# Patient Record
Sex: MALE | Race: WHITE | NOT HISPANIC OR LATINO | Employment: OTHER | URBAN - METROPOLITAN AREA
[De-identification: names, ages, dates, MRNs, and addresses within clinical notes are randomized per-mention and may not be internally consistent; named-entity substitution may affect disease eponyms.]

---

## 2017-02-22 DIAGNOSIS — Z90.79 S/P TURP (STATUS POST TRANSURETHRAL RESECTION OF PROSTATE): ICD-10-CM

## 2017-02-22 DIAGNOSIS — E78.5 HYPERLIPIDEMIA LDL GOAL <130: Primary | ICD-10-CM

## 2017-03-10 DIAGNOSIS — E78.5 HYPERLIPIDEMIA LDL GOAL <130: ICD-10-CM

## 2017-03-10 DIAGNOSIS — Z90.79 S/P TURP (STATUS POST TRANSURETHRAL RESECTION OF PROSTATE): ICD-10-CM

## 2017-03-10 LAB
ALBUMIN SERPL-MCNC: 3.8 G/DL (ref 3.4–5)
ALP SERPL-CCNC: 49 U/L (ref 40–150)
ALT SERPL W P-5'-P-CCNC: 27 U/L (ref 0–70)
ANION GAP SERPL CALCULATED.3IONS-SCNC: 5 MMOL/L (ref 3–14)
AST SERPL W P-5'-P-CCNC: 19 U/L (ref 0–45)
BASOPHILS # BLD AUTO: 0 10E9/L (ref 0–0.2)
BASOPHILS NFR BLD AUTO: 0.4 %
BILIRUB DIRECT SERPL-MCNC: 0.2 MG/DL (ref 0–0.2)
BILIRUB SERPL-MCNC: 0.5 MG/DL (ref 0.2–1.3)
BUN SERPL-MCNC: 16 MG/DL (ref 7–30)
CALCIUM SERPL-MCNC: 8.8 MG/DL (ref 8.5–10.1)
CHLORIDE SERPL-SCNC: 106 MMOL/L (ref 94–109)
CHOLEST SERPL-MCNC: 101 MG/DL
CO2 SERPL-SCNC: 29 MMOL/L (ref 20–32)
CREAT SERPL-MCNC: 1.11 MG/DL (ref 0.66–1.25)
DIFFERENTIAL METHOD BLD: NORMAL
EOSINOPHIL # BLD AUTO: 0.3 10E9/L (ref 0–0.7)
EOSINOPHIL NFR BLD AUTO: 4.4 %
ERYTHROCYTE [DISTWIDTH] IN BLOOD BY AUTOMATED COUNT: 12.5 % (ref 10–15)
GFR SERPL CREATININE-BSD FRML MDRD: 65 ML/MIN/1.7M2
GLUCOSE SERPL-MCNC: 99 MG/DL (ref 70–99)
HCT VFR BLD AUTO: 43.5 % (ref 40–53)
HDLC SERPL-MCNC: 37 MG/DL
HGB BLD-MCNC: 14.8 G/DL (ref 13.3–17.7)
LDLC SERPL CALC-MCNC: 48 MG/DL
LYMPHOCYTES # BLD AUTO: 1.8 10E9/L (ref 0.8–5.3)
LYMPHOCYTES NFR BLD AUTO: 23.3 %
MCH RBC QN AUTO: 32.3 PG (ref 26.5–33)
MCHC RBC AUTO-ENTMCNC: 34 G/DL (ref 31.5–36.5)
MCV RBC AUTO: 95 FL (ref 78–100)
MONOCYTES # BLD AUTO: 0.6 10E9/L (ref 0–1.3)
MONOCYTES NFR BLD AUTO: 7.5 %
NEUTROPHILS # BLD AUTO: 5 10E9/L (ref 1.6–8.3)
NEUTROPHILS NFR BLD AUTO: 64.4 %
NONHDLC SERPL-MCNC: 64 MG/DL
PLATELET # BLD AUTO: 158 10E9/L (ref 150–450)
POTASSIUM SERPL-SCNC: 3.6 MMOL/L (ref 3.4–5.3)
PROT SERPL-MCNC: 6.7 G/DL (ref 6.8–8.8)
PSA SERPL-ACNC: 3.03 UG/L (ref 0–4)
RBC # BLD AUTO: 4.58 10E12/L (ref 4.4–5.9)
SODIUM SERPL-SCNC: 140 MMOL/L (ref 133–144)
T4 FREE SERPL-MCNC: 0.81 NG/DL (ref 0.76–1.46)
TRIGL SERPL-MCNC: 78 MG/DL
TSH SERPL DL<=0.05 MIU/L-ACNC: 3.07 MU/L (ref 0.4–4)
WBC # BLD AUTO: 7.8 10E9/L (ref 4–11)

## 2017-03-10 PROCEDURE — 80048 BASIC METABOLIC PNL TOTAL CA: CPT | Performed by: INTERNAL MEDICINE

## 2017-03-10 PROCEDURE — 85025 COMPLETE CBC W/AUTO DIFF WBC: CPT | Performed by: INTERNAL MEDICINE

## 2017-03-10 PROCEDURE — 80061 LIPID PANEL: CPT | Performed by: INTERNAL MEDICINE

## 2017-03-10 PROCEDURE — 36415 COLL VENOUS BLD VENIPUNCTURE: CPT | Performed by: INTERNAL MEDICINE

## 2017-03-10 PROCEDURE — 84443 ASSAY THYROID STIM HORMONE: CPT | Performed by: INTERNAL MEDICINE

## 2017-03-10 PROCEDURE — G0103 PSA SCREENING: HCPCS | Performed by: INTERNAL MEDICINE

## 2017-03-10 PROCEDURE — 80076 HEPATIC FUNCTION PANEL: CPT | Performed by: INTERNAL MEDICINE

## 2017-03-10 PROCEDURE — 84439 ASSAY OF FREE THYROXINE: CPT | Performed by: INTERNAL MEDICINE

## 2017-03-17 ENCOUNTER — OFFICE VISIT (OUTPATIENT)
Dept: OTHER | Facility: CLINIC | Age: 72
End: 2017-03-17
Attending: INTERNAL MEDICINE
Payer: MEDICARE

## 2017-03-17 VITALS
BODY MASS INDEX: 32.3 KG/M2 | DIASTOLIC BLOOD PRESSURE: 58 MMHG | WEIGHT: 201 LBS | SYSTOLIC BLOOD PRESSURE: 114 MMHG | HEART RATE: 69 BPM | OXYGEN SATURATION: 98 % | HEIGHT: 66 IN

## 2017-03-17 DIAGNOSIS — E78.5 HYPERLIPIDEMIA LDL GOAL <130: ICD-10-CM

## 2017-03-17 DIAGNOSIS — Z00.00 ENCOUNTER FOR MEDICARE ANNUAL WELLNESS EXAM: Primary | ICD-10-CM

## 2017-03-17 DIAGNOSIS — N40.0 BENIGN NON-NODULAR PROSTATIC HYPERPLASIA WITHOUT LOWER URINARY TRACT SYMPTOMS: ICD-10-CM

## 2017-03-17 PROCEDURE — 99213 OFFICE O/P EST LOW 20 MIN: CPT | Mod: ZP | Performed by: INTERNAL MEDICINE

## 2017-03-17 PROCEDURE — 99211 OFF/OP EST MAY X REQ PHY/QHP: CPT

## 2017-03-17 RX ORDER — TERAZOSIN 10 MG/1
10 CAPSULE ORAL AT BEDTIME
Qty: 90 CAPSULE | Refills: 3 | Status: SHIPPED | OUTPATIENT
Start: 2017-03-17 | End: 2018-03-19

## 2017-03-17 RX ORDER — SIMVASTATIN 20 MG
20 TABLET ORAL AT BEDTIME
Qty: 90 TABLET | Refills: 3 | Status: SHIPPED | OUTPATIENT
Start: 2017-03-17 | End: 2018-03-19

## 2017-03-17 NOTE — PROGRESS NOTES
SUBJECTIVE:                                                            Candelario Ruth is a 71 year old male who presents for Preventive Visit.    Are you in the first 12 months of your Medicare Part B coverage?  No    Healthy Habits:    Do you get at least three servings of calcium containing foods daily (dairy, green leafy vegetables, etc.)? no, taking calcium and/or vitamin D supplement: yes - Centrum Silver    Amount of exercise or daily activities, outside of work: 2 hour(s) per day    Problems taking medications regularly No    Medication side effects: No    Have you had an eye exam in the past two years? yes    Do you see a dentist twice per year? yes    Do you have sleep apnea, excessive snoring or daytime drowsiness?yes    COGNITIVE SCREEN  1) Repeat 3 items (Banana, Sunrise, Chair)   2) Clock draw: NORMAL  3) 3 item recall: Recalls 1 object   Results: NORMAL clock, 1-2 items recalled: COGNITIVE IMPAIRMENT LESS LIKELY    Mini-CogTM Copyright S Bertram. Licensed by the author for use in [facility]; reprinted with permission ([email or web address]). All rights reserved.          PROBLEMS TO ADD ON... Patient has not had a pneumonia vaccine.    Reviewed and updated as needed this visit by clinical staff  Tobacco  Allergies  Meds  Med Hx  Surg Hx  Fam Hx  Soc Hx        Reviewed and updated as needed this visit by Provider        Social History   Substance Use Topics     Smoking status: Former Smoker     Quit date: 3/6/1973     Smokeless tobacco: Never Used     Alcohol use Yes      Comment: occasional       The patient does not drink >3 drinks per day nor >7 drinks per week.    Today's PHQ-2 Score:   PHQ-2 ( 1999 Pfizer) 4/4/2016 3/4/2016   Q1: Little interest or pleasure in doing things 0 0   Q2: Feeling down, depressed or hopeless 0 0   PHQ-2 Score 0 0       Do you feel safe in your environment - Yes    Do you have a Health Care Directive?: No: Advance care planning reviewed with patient;  information given to patient to review.    Current providers sharing in care for this patient include:   Patient Care Team:  Trenton Thomas MD as PCP - General      Hearing impairment: No    Ability to successfully perform activities of daily living: Yes, no assistance needed     Fall risk: 0    Home safety:  none identified        The following health maintenance items are reviewed in Epic and correct as of today:  Health Maintenance   Topic Date Due     HEPATITIS C SCREENING  03/28/1963     PNEUMOCOCCAL (1 of 2 - PCV13) 03/28/2010     AORTIC ANEURYSM SCREENING (SYSTEM ASSIGNED)  03/28/2010     FALL RISK ASSESSMENT  04/04/2017     INFLUENZA VACCINE (SYSTEM ASSIGNED)  09/01/2017     ADVANCE DIRECTIVE PLANNING Q5 YRS (NO INBASKET)  03/14/2018     COLONOSCOPY Q10 YR INBASKET MESSAGE  03/06/2022     LIPID SCREEN Q5 YR MALE (SYSTEM ASSIGNED)  03/10/2022     TETANUS Q10 YR  11/26/2024         Pneumonia Vaccine:Adults age 65+ who received their first dose of Pneumovax (PPSV23) prior to age 65 years: Should be given PCV 13 > 1 year after their most recent PPSV23 AND should be given a another dose of PPSV23 > 5 years after their most recent dose of PPSV23     ROS:  C: NEGATIVE for fever, chills, change in weight  I: NEGATIVE for worrisome rashes, moles or lesions  E: NEGATIVE for vision changes or irritation  E/M: NEGATIVE for ear, mouth and throat problems  R: NEGATIVE for significant cough or SOB  B: NEGATIVE for masses, tenderness or discharge  CV: NEGATIVE for chest pain, palpitations or peripheral edema  GI: NEGATIVE for nausea, abdominal pain, heartburn, or change in bowel habits  : NEGATIVE for frequency, dysuria, or hematuria  M: NEGATIVE for significant arthralgias or myalgia  N: NEGATIVE for weakness, dizziness or paresthesias  E: NEGATIVE for temperature intolerance, skin/hair changes  H: NEGATIVE for bleeding problems  P: NEGATIVE for changes in mood or affect    BP Readings from Last 3 Encounters:    17 114/58   16 137/64   16 135/76    Wt Readings from Last 3 Encounters:   17 201 lb (91.2 kg)   16 200 lb 9.6 oz (91 kg)   16 198 lb (89.8 kg)                  Patient Active Problem List   Diagnosis     Hypertension goal BP (blood pressure) < 130/80     Hyperlipidemia LDL goal <130     Advanced directives, counseling/discussion     S/P TURP (status post transurethral resection of prostate)     Past Surgical History   Procedure Laterality Date     C nonspecific procedure       lumbar surgery     Cystoscopy, transurethral resection (tur) prostate, combined N/A 2016     Procedure: COMBINED CYSTOSCOPY, TRANSURETHRAL RESECTION (TUR) PROSTATE;  Surgeon: Pepe Srinivasan MD;  Location:  OR       Social History   Substance Use Topics     Smoking status: Former Smoker     Quit date: 3/6/1973     Smokeless tobacco: Never Used     Alcohol use Yes      Comment: occasional     Family History   Problem Relation Age of Onset     HEART DISEASE Mother      alive age (1924)     DIABETES Father       age 84     Family History Negative Brother      alive age 57     Family History Negative Sister      alive age 60     Family History Negative Sister      alive age 55     Family History Negative Sister      alive age 52         Current Outpatient Prescriptions   Medication Sig Dispense Refill     simvastatin (ZOCOR) 20 MG tablet Take 1 tablet (20 mg) by mouth At Bedtime 90 tablet 3     terazosin (HYTRIN) 10 MG capsule Take 1 capsule (10 mg) by mouth At Bedtime 90 capsule 3     ciprofloxacin (CIPRO) 500 MG tablet Take 1 tablet (500 mg) by mouth 2 times daily (Patient not taking: Reported on 3/17/2017) 14 tablet 0     [DISCONTINUED] simvastatin (ZOCOR) 20 MG tablet Take 1 tablet (20 mg) by mouth At Bedtime 90 tablet 3     [DISCONTINUED] terazosin (HYTRIN) 10 MG capsule Take 1 capsule (10 mg) by mouth At Bedtime 90 capsule 3     Allergies   Allergen Reactions     Amoxicillin       "Turns him purple     OBJECTIVE:                                                            /58 (BP Location: Right arm, Patient Position: Chair, Cuff Size: Adult Large)  Pulse 69  Ht 5' 6\" (1.676 m)  Wt 201 lb (91.2 kg)  SpO2 98%  BMI 32.44 kg/m2 Estimated body mass index is 32.44 kg/(m^2) as calculated from the following:    Height as of this encounter: 5' 6\" (1.676 m).    Weight as of this encounter: 201 lb (91.2 kg).  EXAM:   GENERAL: healthy, alert and no distress  EYES: Eyes grossly normal to inspection, PERRL and conjunctivae and sclerae normal  HENT: ear canals and TM's normal, nose and mouth without ulcers or lesions  NECK: no adenopathy, no asymmetry, masses, or scars and thyroid normal to palpation  RESP: lungs clear to auscultation - no rales, rhonchi or wheezes  CV: regular rate and rhythm, normal S1 S2, no S3 or S4, no murmur, click or rub, no peripheral edema and peripheral pulses strong  ABDOMEN: soft, nontender, no hepatosplenomegaly, no masses and bowel sounds normal  MS: no gross musculoskeletal defects noted, no edema  SKIN: no suspicious lesions or rashes  NEURO: Normal strength and tone, mentation intact and speech normal  PSYCH: mentation appears normal, affect normal/bright    ASSESSMENT / PLAN:                                                            1. Encounter for Medicare annual wellness exam    - GASTROENTEROLOGY ADULT REF PROCEDURE ONLY  - Follow-Up with Vascular Medicine; Future  - CBC with platelets differential; Future  - T4 free; Future  - T3 Free; Future  - TSH; Future  - Basic metabolic panel; Future  - Hepatic panel; Future  - Lipid Profile; Future  - Hemoglobin A1c; Future  - Prostate spec antigen screen; Future  - PNEUMOCOCCAL CONJ VACCINE 13 VALENT IM (PCV13); Future    2. Hyperlipidemia LDL goal <130  At goal  - simvastatin (ZOCOR) 20 MG tablet; Take 1 tablet (20 mg) by mouth At Bedtime  Dispense: 90 tablet; Refill: 3  - OFFICE/OUTPT VISIT,JEAN MARIE PIERCE III  - Follow-Up " "with Vascular Medicine; Future  - T4 free; Future  - T3 Free; Future  - TSH; Future  - Lipid Profile; Future  - Hemoglobin A1c; Future    3. Benign non-nodular prostatic hyperplasia without lower urinary tract symptoms  No difficulty with urination  - terazosin (HYTRIN) 10 MG capsule; Take 1 capsule (10 mg) by mouth At Bedtime  Dispense: 90 capsule; Refill: 3  - OFFICE/OUTPT VISIT,EST,LEVL III  - Follow-Up with Vascular Medicine; Future  - Prostate spec antigen screen; Future    End of Life Planning:  Patient currently has an advanced directive: Yes.  Practitioner is supportive of decision. Pt wishes to be full code.    COUNSELING:  Reviewed preventive health counseling, as reflected in patient instructions       Regular exercise       Healthy diet/nutrition       Vision screening       Hearing screening       Dental care       Colon cancer screening        Estimated body mass index is 32.44 kg/(m^2) as calculated from the following:    Height as of this encounter: 5' 6\" (1.676 m).    Weight as of this encounter: 201 lb (91.2 kg).  Weight management plan: Discussed healthy diet and exercise guidelines and patient will follow up in 12 months in clinic to re-evaluate.   reports that he quit smoking about 44 years ago. He has never used smokeless tobacco.    Greater than one half the 15 minutes not spent on preventive care during this visit was spent providing aducation and counselling regarding item(s) # 2 onward as delineated above.     Appropriate preventive services were discussed with this patient, including applicable screening as appropriate for cardiovascular disease, diabetes, osteopenia/osteoporosis, and glaucoma.  As appropriate for age/gender, discussed screening for colorectal cancer, prostate cancer, breast cancer, and cervical cancer. Checklist reviewing preventive services available has been given to the patient.    Reviewed patients plan of care and provided an AVS. The Basic Care Plan (routine " screening as documented in Health Maintenance) for Candelario meets the Care Plan requirement. This Care Plan has been established and reviewed with the Patient.    Counseling Resources:  ATP IV Guidelines  Pooled Cohorts Equation Calculator  Breast Cancer Risk Calculator  FRAX Risk Assessment  ICSI Preventive Guidelines  Dietary Guidelines for Americans, 2010  USDA's MyPlate  ASA Prophylaxis  Lung CA Screening    Trenton Thomas MD  [facility] [place]    HPI      ROS      Physical Exam

## 2017-03-17 NOTE — NURSING NOTE
"Chief Complaint   Patient presents with     RECHECK     annual physical and labs       Initial /58 (BP Location: Right arm, Patient Position: Chair, Cuff Size: Adult Large)  Pulse 69  Ht 5' 6\" (1.676 m)  Wt 201 lb (91.2 kg)  SpO2 98%  BMI 32.44 kg/m2 Estimated body mass index is 32.44 kg/(m^2) as calculated from the following:    Height as of this encounter: 5' 6\" (1.676 m).    Weight as of this encounter: 201 lb (91.2 kg).  Medication Reconciliation: complete     Face to face time: 7 minutes    Liset Pimentel CMA    "

## 2017-03-17 NOTE — MR AVS SNAPSHOT
After Visit Summary   3/17/2017    Candelario Ruth    MRN: [id number]           Patient Information     Date Of Birth          1945        Visit Information        Provider Department      3/17/2017 8:00 AM Trenton Thomas MD [facility]      Today's Diagnoses     Encounter for Medicare annual wellness exam    -  1    Hyperlipidemia LDL goal <130        Benign non-nodular prostatic hyperplasia without lower urinary tract symptoms          Care Instructions      Preventive Health Recommendations:       Male Ages 65 and over    Yearly exam:             See your health care provider every year in order to  o   Review health changes.   o   Discuss preventive care.    o   Review your medicines if your doctor has prescribed any.    Talk with your health care provider about whether you should have a test to screen for prostate cancer (PSA).    Every 3 years, have a diabetes test (fasting glucose). If you are at risk for diabetes, you should have this test more often.    Every 5 years, have a cholesterol test. Have this test more often if you are at risk for high cholesterol or heart disease.     Every 10 years, have a colonoscopy. Or, have a yearly FIT test (stool test). These exams will check for colon cancer.    Talk to with your health care provider about screening for Abdominal Aortic Aneurysm if you have a family history of AAA or have a history of smoking.  Shots:     Get a flu shot each year.     Get a tetanus shot every 10 years.     Talk to your doctor about your pneumonia vaccines. There are now two you should receive - Pneumovax (PPSV 23) and Prevnar (PCV 13).    Talk to your doctor about a shingles vaccine.     Talk to your doctor about the hepatitis B vaccine.  Nutrition:     Eat at least 5 servings of fruits and vegetables each day.     Eat whole-grain bread, whole-wheat pasta and brown rice instead of white  grains and rice.     Talk to your doctor about Calcium and Vitamin D.   Lifestyle    Exercise for at least 150 minutes a week (30 minutes a day, 5 days a week). This will help you control your weight and prevent disease.     Limit alcohol to one drink per day.     No smoking.     Wear sunscreen to prevent skin cancer.     See your dentist every six months for an exam and cleaning.     See your eye doctor every 1 to 2 years to screen for conditions such as glaucoma, macular degeneration and cataracts.        Follow-ups after your visit        Additional Services     GASTROENTEROLOGY ADULT REF PROCEDURE ONLY       Dr. Vitor King, [phone]    Last Lab Result: Creatinine (mg/dL)       Date                     Value                 03/10/2017               1.11             ----------  Body mass index is 32.44 kg/(m^2).     Needed:  No  Language:  English    Patient will be contacted to schedule procedure.     Please be aware that coverage of these services is subject to the terms and limitations of your health insurance plan.  Call member services at your health plan with any benefit or coverage questions.  Any procedures must be performed at a [place] facility OR coordinated by your clinic's referral office.    Please bring the following with you to your appointment:    (1) Any X-Rays, CTs or MRIs which have been performed.  Contact the facility where they were done to arrange for  prior to your scheduled appointment.    (2) List of current medications   (3) This referral request   (4) Any documents/labs given to you for this referral            Follow-Up with Vascular Medicine                 Future tests that were ordered for you today     Open Future Orders        Priority Expected Expires Ordered    PNEUMOCOCCAL CONJ VACCINE 13 VALENT IM (PCV13) Routine 3/17/2018 3/17/2018 3/17/2017    T3 Free Routine 3/17/2018 4/17/2018 3/17/2017    TSH Routine 3/17/2018 4/17/2018 3/17/2017    Basic metabolic  "panel Routine 3/17/2018 2018 3/17/2017    Hepatic panel Routine 3/17/2018 2018 3/17/2017    Lipid Profile Routine 3/17/2018 2018 3/17/2017    Hemoglobin A1c Routine 3/17/2018 2018 3/17/2017    Prostate spec antigen screen Routine 3/17/2018 2018 3/17/2017    Follow-Up with Vascular Medicine Routine 3/17/2018 2018 3/17/2017    CBC with platelets differential Routine 3/17/2018 2018 3/17/2017    T4 free Routine 3/17/2018 2018 3/17/2017            Who to contact     If you have questions or need follow up information about today's clinic visit or your schedule please contact [facility] directly at [phone].  Normal or non-critical lab and imaging results will be communicated to you by MyChart, letter or phone within 4 business days after the clinic has received the results. If you do not hear from us within 7 days, please contact the clinic through Spor Chargershart or phone. If you have a critical or abnormal lab result, we will notify you by phone as soon as possible.  Submit refill requests through Motion Dispatch or call your pharmacy and they will forward the refill request to us. Please allow 3 business days for your refill to be completed.          Additional Information About Your Visit        MyChart Information     Motion Dispatch lets you send messages to your doctor, view your test results, renew your prescriptions, schedule appointments and more. To sign up, go to [email or web address] . Click on \"Log in\" on the left side of the screen, which will take you to the Welcome page. Then click on \"Sign up Now\" on the right side of the page.     You will be asked to enter the access code listed below, as well as some personal information. Please follow the directions to create your username and password.     Your access code is: [email or web address]  Expires: 6/15/2017  9:03 AM     Your access code will  in 90 days. If you need help or a new code, please call your [place] " "clinic or [phone].        Care EveryWhere ID     This is your Care EveryWhere ID. This could be used by other organizations to access your [place] medical records  [id number]        Your Vitals Were     Pulse Height Pulse Oximetry BMI (Body Mass Index)          69 5' 6\" (1.676 m) 98% 32.44 kg/m2         Blood Pressure from Last 3 Encounters:   03/17/17 114/58   04/13/16 137/64   04/04/16 135/76    Weight from Last 3 Encounters:   03/17/17 201 lb (91.2 kg)   04/12/16 200 lb 9.6 oz (91 kg)   04/04/16 198 lb (89.8 kg)              We Performed the Following     GASTROENTEROLOGY ADULT REF PROCEDURE ONLY     OFFICE/OUTPT VISIT,JEAN MARIE PIERCE III          Where to get your medicines      These medications were sent to [facility] [id number] [facility] [place] - [address] AT [facility]  [address] [place] [address]     Phone:  [phone]     simvastatin 20 MG tablet    terazosin 10 MG capsule          Primary Care Provider Office Phone # Fax #    Trenton Thomas [phone]       [place] [facility] [address]  [place] [address]        Thank you!     Thank you for choosing [facility] [place]  for your care. Our goal is always to provide you with excellent care. Hearing back from our patients is one way we can continue to improve our services. Please take a few minutes to complete the written survey that you may receive in the mail after your visit with us. Thank you!             Your Updated Medication List - Protect others around you: Learn how to safely use, store and throw away your medicines at www.disposemymeds.org.          This list is accurate as of: 3/17/17  9:03 AM.  Always use your most recent med list.                   Brand Name Dispense Instructions for use    ciprofloxacin 500 MG tablet    CIPRO    14 tablet    Take 1 tablet (500 mg) by mouth 2 times daily       simvastatin 20 MG tablet    ZOCOR    90 tablet    Take 1 " tablet (20 mg) by mouth At Bedtime       terazosin 10 MG capsule    HYTRIN    90 capsule    Take 1 capsule (10 mg) by mouth At Bedtime

## 2017-05-03 ENCOUNTER — OFFICE VISIT (OUTPATIENT)
Dept: OTHER | Facility: CLINIC | Age: 72
End: 2017-05-03
Attending: PHYSICIAN ASSISTANT
Payer: MEDICARE

## 2017-05-03 VITALS
WEIGHT: 199 LBS | HEART RATE: 79 BPM | BODY MASS INDEX: 32.12 KG/M2 | OXYGEN SATURATION: 97 % | DIASTOLIC BLOOD PRESSURE: 68 MMHG | SYSTOLIC BLOOD PRESSURE: 116 MMHG

## 2017-05-03 DIAGNOSIS — M54.5 ACUTE RIGHT-SIDED LOW BACK PAIN, WITH SCIATICA PRESENCE UNSPECIFIED: Primary | ICD-10-CM

## 2017-05-03 PROCEDURE — 99213 OFFICE O/P EST LOW 20 MIN: CPT | Mod: ZP | Performed by: PHYSICIAN ASSISTANT

## 2017-05-03 PROCEDURE — 99211 OFF/OP EST MAY X REQ PHY/QHP: CPT

## 2017-05-03 NOTE — LETTER
[facility]  [address]  [place] [address]  [phone]          May 3, 2017    RE:  Candelario Ruth                                                                                                                                                       [address] [place] [place] [address]            To whom it may concern:    Candelario Ruth was evaluated in our clinic on 5/3/17. Please excuse him from work due to health reasons on 5/2/17. Due to his health, he should work no longer than 10 hours at a time. Please feel free to call with any questions.     Sincerely,        Angie Amin PA-C  [phone]

## 2017-05-03 NOTE — NURSING NOTE
"Chief Complaint   Patient presents with     RECHECK     shooting pains down legs, thinks it is back/sciatic pain       Initial /68 (BP Location: Right arm, Patient Position: Chair, Cuff Size: Adult Large)  Pulse 79  Wt 199 lb (90.3 kg)  SpO2 97%  BMI 32.12 kg/m2 Estimated body mass index is 32.12 kg/(m^2) as calculated from the following:    Height as of 3/17/17: 5' 6\" (1.676 m).    Weight as of this encounter: 199 lb (90.3 kg).  Medication Reconciliation: complete    "

## 2017-05-03 NOTE — MR AVS SNAPSHOT
"              After Visit Summary   5/3/2017    Candelario Ruth    MRN: [id number]           Patient Information     Date Of Birth          1945        Visit Information        Provider Department      5/3/2017 11:15 AM Angie Amin PA-C [facility] [place] [facility]      Care Instructions    Please try Ibuprofen (Advil, Aleve) for pain and inflammation. If this is not helping, or symptoms worsen, then call and we can prescribe a medrol dose-jerilyn (steroid) and consider physical therapy.         Follow-ups after your visit        Follow-up notes from your care team     Return if symptoms worsen or fail to improve.      Who to contact     If you have questions or need follow up information about today's clinic visit or your schedule please contact [facility] directly at [phone].  Normal or non-critical lab and imaging results will be communicated to you by VONTRAVELhart, letter or phone within 4 business days after the clinic has received the results. If you do not hear from us within 7 days, please contact the clinic through VONTRAVELhart or phone. If you have a critical or abnormal lab result, we will notify you by phone as soon as possible.  Submit refill requests through Selectable Media or call your pharmacy and they will forward the refill request to us. Please allow 3 business days for your refill to be completed.          Additional Information About Your Visit        MyChart Information     Selectable Media lets you send messages to your doctor, view your test results, renew your prescriptions, schedule appointments and more. To sign up, go to [email or web address] . Click on \"Log in\" on the left side of the screen, which will take you to the Welcome page. Then click on \"Sign up Now\" on the right side of the page.     You will be asked to enter the access code listed below, as well as some personal information. Please follow the directions to " create your username and password.     Your access code is: [email or web address]  Expires: 6/15/2017  9:03 AM     Your access code will  in 90 days. If you need help or a new code, please call your [place] clinic or [phone].        Care EveryWhere ID     This is your Care EveryWhere ID. This could be used by other organizations to access your [place] medical records  [id number]        Your Vitals Were     Pulse Pulse Oximetry BMI (Body Mass Index)             79 97% 32.12 kg/m2          Blood Pressure from Last 3 Encounters:   17 116/68   17 114/58   16 137/64    Weight from Last 3 Encounters:   17 199 lb (90.3 kg)   17 201 lb (91.2 kg)   16 200 lb 9.6 oz (91 kg)              Today, you had the following     No orders found for display       Primary Care Provider Office Phone # Fax #    Isaiahariel Joseph Thomas [phone]       [place] [facility] [address]  [place] [address]        Thank you!     Thank you for choosing [facility] [place]  for your care. Our goal is always to provide you with excellent care. Hearing back from our patients is one way we can continue to improve our services. Please take a few minutes to complete the written survey that you may receive in the mail after your visit with us. Thank you!             Your Updated Medication List - Protect others around you: Learn how to safely use, store and throw away your medicines at www.disposemymeds.org.          This list is accurate as of: 5/3/17 11:15 AM.  Always use your most recent med list.                   Brand Name Dispense Instructions for use    simvastatin 20 MG tablet    ZOCOR    90 tablet    Take 1 tablet (20 mg) by mouth At Bedtime       terazosin 10 MG capsule    HYTRIN    90 capsule    Take 1 capsule (10 mg) by mouth At Bedtime

## 2017-05-03 NOTE — PATIENT INSTRUCTIONS
Please try Ibuprofen (Advil, Aleve) for pain and inflammation. If this is not helping, or symptoms worsen, then call and we can prescribe a medrol dose-jerilyn (steroid) and consider physical therapy.

## 2017-05-03 NOTE — PROGRESS NOTES
[facility]  VASCULAR MEDICINE     FOLLOW-UP VISIT      PRIMARY HEALTH CARE PROVIDER:  Trenton Thomas      REASON FOR VISIT:  Low back pain with right lower extremity radiculopathy      HPI: Candelario Ruth is a 72 year old male with a prior history of lumbar back surgery in . Since then he has done well up until 2 days ago when he developed low back pain with radiculopathy down the right leg all the way to his toes. He was unable to work yesterday due to the pain. He rested and took some Tylenol. Today his symptoms have improved. He states that his work hours were just increased from 10 hour shifts to 12 hour shifts. He stands on the job and feels his symptoms were aggravated by this increase in work hours. He had no trouble working 10 hour shifts. He denies any numbness, tingling, left lower extremity radiculopathy, loss of bowel or bladder function. This is not affecting sleep.     PAST MEDICAL HISTORY  Past Medical History:   Diagnosis Date     BPH      Sleep apnea        CURRENT MEDICATIONS    Current Outpatient Prescriptions on File Prior to Visit:  simvastatin (ZOCOR) 20 MG tablet Take 1 tablet (20 mg) by mouth At Bedtime   terazosin (HYTRIN) 10 MG capsule Take 1 capsule (10 mg) by mouth At Bedtime     No current facility-administered medications on file prior to visit.     PAST SURGICAL HISTORY:  Past Surgical History:   Procedure Laterality Date     C NONSPECIFIC PROCEDURE      lumbar surgery     CYSTOSCOPY, TRANSURETHRAL RESECTION (TUR) PROSTATE, COMBINED N/A 2016    Procedure: COMBINED CYSTOSCOPY, TRANSURETHRAL RESECTION (TUR) PROSTATE;  Surgeon: Pepe Srinivasan MD;  Location:  OR       ALLERGIES     Allergies   Allergen Reactions     Amoxicillin      Turns him purple       FAMILY HISTORY  Family History   Problem Relation Age of Onset     HEART DISEASE Mother      alive age (1924)     DIABETES Father       age 84     CEREBROVASCULAR  DISEASE Father      CEREBROVASCULAR DISEASE Brother      alive age 57     Family History Negative Sister      alive age 60     Family History Negative Sister      alive age 55     Family History Negative Sister      alive age 52       SOCIAL HISTORY  Social History     Social History     Marital status:      Spouse name: Andra     Number of children: 3     Years of education: 14     Occupational History     assembles [employer]     Social History Main Topics     Smoking status: Former Smoker     Quit date: 3/6/1973     Smokeless tobacco: Never Used     Alcohol use Yes      Comment: occasional     Drug use: No     Sexual activity: Not on file     Other Topics Concern     Not on file     Social History Narrative       ROS:   General: No change in weight, sleep or appetite.  Normal energy.  No fever or chills  Eyes: Negative for vision changes or eye problems  ENT: No problems with ears, nose or throat.  No difficulty swallowing.  Resp: No coughing, wheezing or shortness of breath  CV: No chest pains or palpitations  GI: No nausea, vomiting,  heartburn, abdominal pain, diarrhea, constipation or change in bowel habits  : No urinary frequency or dysuria, bladder or kidney problems  Musculoskeletal: Low back pain with right lower extremity radiculopathy x 2 days  Neurologic: No headaches, numbness, tingling, weakness, problems with balance or coordination  Psychiatric: No problems with anxiety, depression or mental health  Heme/immune/allergy: No history of bleeding or clotting problems or anemia.  No allergies or immune system problems  Endocrine: No history of thyroid disease, diabetes or other endocrine disorders  Skin: No rashes,worrisome lesions or skin problems  Vascular:  No claudication, lifestyle limiting or otherwise; no ischemic rest pain; no non-healing ulcers. No weakness, No loss of sensation     EXAM:  /68 (BP Location: Right arm, Patient Position: Chair, Cuff Size: Adult  Large)  Pulse 79  Wt 90.3 kg (199 lb)  SpO2 97%  BMI 32.12 kg/m2  In general, the patient is a pleasant male in no apparent distress.    HEENT: NC/AT.  PERRLA.  EOMI.  Sclerae white, not injected.  Nares clear.  Pharynx without erythema or exudate.  Dentition intact.    Neck: Carotids +2/2 bilaterally without bruits.    Heart: RRR. Normal S1, S2 splits physiologically. No murmur, rub, click, or gallop.   Lungs: CTA.  No ronchi, wheezes, rales.    Abdomen: Soft, nontender, nondistended.   Extremities: No clubbing, cyanosis, or edema. No wounds. Some low back / SI area tenderness on right.       Labs:  LIPID RESULTS:  Lab Results   Component Value Date    CHOL 101 03/10/2017    HDL 37 (L) 03/10/2017    LDL 48 03/10/2017    TRIG 78 03/10/2017    CHOLHDLRATIO 2.2 03/06/2015       LIVER ENZYME RESULTS:  Lab Results   Component Value Date    AST 19 03/10/2017    ALT 27 03/10/2017       CBC RESULTS:  Lab Results   Component Value Date    WBC 7.8 03/10/2017    RBC 4.58 03/10/2017    HGB 14.8 03/10/2017    HCT 43.5 03/10/2017    MCV 95 03/10/2017    MCH 32.3 03/10/2017    MCHC 34.0 03/10/2017    RDW 12.5 03/10/2017     03/10/2017       BMP RESULTS:  Lab Results   Component Value Date     03/10/2017    POTASSIUM 3.6 03/10/2017    CHLORIDE 106 03/10/2017    CO2 29 03/10/2017    ANIONGAP 5 03/10/2017    GLC 99 03/10/2017    BUN 16 03/10/2017    CR 1.11 03/10/2017    GFRESTIMATED 65 03/10/2017    GFRESTBLACK 79 03/10/2017    CODIE 8.8 03/10/2017        A1C RESULTS:  Lab Results   Component Value Date    A1C 5.4 02/25/2016       THYROID RESULTS:  Lab Results   Component Value Date    TSH 3.07 03/10/2017         Assessment and Plan:     (M54.5) Acute right-sided low back pain, with sciatica  Comment: Hx of prior radiculopathy for which he had surgery in 1993. Now recurrent symptoms after extended hours of work (on feet), but improving with rest yesterday.   Plan:   -Try ibuprofen as symptoms are improving.    -Declined medrol dose jerilyn today. If his symptoms do not improve, he was advised to call and this can be prescribed.   -Will refer to PT if symptoms don't improve.   -Note to work provided.         Angie Amin PA-C

## 2018-03-01 ENCOUNTER — TELEPHONE (OUTPATIENT)
Dept: OTHER | Facility: CLINIC | Age: 73
End: 2018-03-01

## 2018-03-01 NOTE — TELEPHONE ENCOUNTER
Called and left a message for the patient to call us back regarding an appointment. Dr. Thomas is unavailable at the current time, appointment needs to be rescheduled.

## 2018-03-12 DIAGNOSIS — N40.0 BENIGN NON-NODULAR PROSTATIC HYPERPLASIA WITHOUT LOWER URINARY TRACT SYMPTOMS: ICD-10-CM

## 2018-03-12 DIAGNOSIS — E78.5 HYPERLIPIDEMIA LDL GOAL <130: ICD-10-CM

## 2018-03-12 DIAGNOSIS — Z00.00 ENCOUNTER FOR MEDICARE ANNUAL WELLNESS EXAM: ICD-10-CM

## 2018-03-12 LAB
ALBUMIN SERPL-MCNC: 3.7 G/DL (ref 3.4–5)
ALP SERPL-CCNC: 49 U/L (ref 40–150)
ALT SERPL W P-5'-P-CCNC: 26 U/L (ref 0–70)
ANION GAP SERPL CALCULATED.3IONS-SCNC: 4 MMOL/L (ref 3–14)
AST SERPL W P-5'-P-CCNC: 22 U/L (ref 0–45)
BASOPHILS # BLD AUTO: 0 10E9/L (ref 0–0.2)
BASOPHILS NFR BLD AUTO: 0.3 %
BILIRUB DIRECT SERPL-MCNC: 0.2 MG/DL (ref 0–0.2)
BILIRUB SERPL-MCNC: 0.5 MG/DL (ref 0.2–1.3)
BUN SERPL-MCNC: 14 MG/DL (ref 7–30)
CALCIUM SERPL-MCNC: 8.8 MG/DL (ref 8.5–10.1)
CHLORIDE SERPL-SCNC: 106 MMOL/L (ref 94–109)
CHOLEST SERPL-MCNC: 110 MG/DL
CO2 SERPL-SCNC: 30 MMOL/L (ref 20–32)
CREAT SERPL-MCNC: 1.22 MG/DL (ref 0.66–1.25)
DIFFERENTIAL METHOD BLD: NORMAL
EOSINOPHIL # BLD AUTO: 0.3 10E9/L (ref 0–0.7)
EOSINOPHIL NFR BLD AUTO: 4.4 %
ERYTHROCYTE [DISTWIDTH] IN BLOOD BY AUTOMATED COUNT: 12.6 % (ref 10–15)
GFR SERPL CREATININE-BSD FRML MDRD: 58 ML/MIN/1.7M2
GLUCOSE SERPL-MCNC: 108 MG/DL (ref 70–99)
HBA1C MFR BLD: 5.5 % (ref 4.3–6)
HCT VFR BLD AUTO: 44.9 % (ref 40–53)
HDLC SERPL-MCNC: 38 MG/DL
HGB BLD-MCNC: 14.8 G/DL (ref 13.3–17.7)
LDLC SERPL CALC-MCNC: 51 MG/DL
LYMPHOCYTES # BLD AUTO: 1.5 10E9/L (ref 0.8–5.3)
LYMPHOCYTES NFR BLD AUTO: 23.6 %
MCH RBC QN AUTO: 31.7 PG (ref 26.5–33)
MCHC RBC AUTO-ENTMCNC: 33 G/DL (ref 31.5–36.5)
MCV RBC AUTO: 96 FL (ref 78–100)
MONOCYTES # BLD AUTO: 0.5 10E9/L (ref 0–1.3)
MONOCYTES NFR BLD AUTO: 7.7 %
NEUTROPHILS # BLD AUTO: 4.2 10E9/L (ref 1.6–8.3)
NEUTROPHILS NFR BLD AUTO: 64 %
NONHDLC SERPL-MCNC: 72 MG/DL
PLATELET # BLD AUTO: 152 10E9/L (ref 150–450)
POTASSIUM SERPL-SCNC: 4.3 MMOL/L (ref 3.4–5.3)
PROT SERPL-MCNC: 6.9 G/DL (ref 6.8–8.8)
PSA SERPL-ACNC: 3.16 UG/L (ref 0–4)
RBC # BLD AUTO: 4.67 10E12/L (ref 4.4–5.9)
SODIUM SERPL-SCNC: 140 MMOL/L (ref 133–144)
T3FREE SERPL-MCNC: 2.4 PG/ML (ref 2.3–4.2)
T4 FREE SERPL-MCNC: 0.78 NG/DL (ref 0.76–1.46)
TRIGL SERPL-MCNC: 106 MG/DL
TSH SERPL DL<=0.005 MIU/L-ACNC: 2.07 MU/L (ref 0.4–4)
WBC # BLD AUTO: 6.5 10E9/L (ref 4–11)

## 2018-03-12 PROCEDURE — 85025 COMPLETE CBC W/AUTO DIFF WBC: CPT | Performed by: INTERNAL MEDICINE

## 2018-03-12 PROCEDURE — 80076 HEPATIC FUNCTION PANEL: CPT | Performed by: INTERNAL MEDICINE

## 2018-03-12 PROCEDURE — 80061 LIPID PANEL: CPT | Performed by: INTERNAL MEDICINE

## 2018-03-12 PROCEDURE — 36415 COLL VENOUS BLD VENIPUNCTURE: CPT | Performed by: INTERNAL MEDICINE

## 2018-03-12 PROCEDURE — 83036 HEMOGLOBIN GLYCOSYLATED A1C: CPT | Performed by: INTERNAL MEDICINE

## 2018-03-12 PROCEDURE — 84439 ASSAY OF FREE THYROXINE: CPT | Performed by: INTERNAL MEDICINE

## 2018-03-12 PROCEDURE — 84481 FREE ASSAY (FT-3): CPT | Performed by: INTERNAL MEDICINE

## 2018-03-12 PROCEDURE — 80048 BASIC METABOLIC PNL TOTAL CA: CPT | Performed by: INTERNAL MEDICINE

## 2018-03-12 PROCEDURE — 84443 ASSAY THYROID STIM HORMONE: CPT | Performed by: INTERNAL MEDICINE

## 2018-03-12 PROCEDURE — G0103 PSA SCREENING: HCPCS | Performed by: INTERNAL MEDICINE

## 2018-03-20 ENCOUNTER — TELEPHONE (OUTPATIENT)
Dept: OTHER | Facility: CLINIC | Age: 73
End: 2018-03-20

## 2018-03-20 ENCOUNTER — OFFICE VISIT (OUTPATIENT)
Dept: OTHER | Facility: CLINIC | Age: 73
End: 2018-03-20
Attending: INTERNAL MEDICINE
Payer: MEDICARE

## 2018-03-20 VITALS
OXYGEN SATURATION: 97 % | BODY MASS INDEX: 34.7 KG/M2 | SYSTOLIC BLOOD PRESSURE: 129 MMHG | WEIGHT: 215 LBS | HEART RATE: 56 BPM | DIASTOLIC BLOOD PRESSURE: 68 MMHG

## 2018-03-20 DIAGNOSIS — E78.5 HYPERLIPIDEMIA LDL GOAL <130: ICD-10-CM

## 2018-03-20 DIAGNOSIS — N40.0 BENIGN NON-NODULAR PROSTATIC HYPERPLASIA WITHOUT LOWER URINARY TRACT SYMPTOMS: ICD-10-CM

## 2018-03-20 DIAGNOSIS — R73.01 IFG (IMPAIRED FASTING GLUCOSE): ICD-10-CM

## 2018-03-20 DIAGNOSIS — Z00.00 MEDICARE ANNUAL WELLNESS VISIT, SUBSEQUENT: Primary | ICD-10-CM

## 2018-03-20 PROCEDURE — G0439 PPPS, SUBSEQ VISIT: HCPCS | Mod: ZP | Performed by: INTERNAL MEDICINE

## 2018-03-20 PROCEDURE — 99213 OFFICE O/P EST LOW 20 MIN: CPT | Mod: ZP | Performed by: INTERNAL MEDICINE

## 2018-03-20 PROCEDURE — G0463 HOSPITAL OUTPT CLINIC VISIT: HCPCS

## 2018-03-20 RX ORDER — SIMVASTATIN 20 MG
20 TABLET ORAL AT BEDTIME
Qty: 90 TABLET | Refills: 3 | Status: SHIPPED | OUTPATIENT
Start: 2018-03-20 | End: 2019-03-04

## 2018-03-20 RX ORDER — TERAZOSIN 10 MG/1
10 CAPSULE ORAL AT BEDTIME
Qty: 90 CAPSULE | Refills: 3 | Status: SHIPPED | OUTPATIENT
Start: 2018-03-20 | End: 2019-03-04

## 2018-03-20 NOTE — PROGRESS NOTES
Candelario Ruth is a 72 year old male who presents for:       Medicare annual wellness visit, subsequent  Hyperlipidemia LDL goal <130  Benign non-nodular prostatic hyperplasia without lower urinary tract symptoms       Current providers caring for this patient include:  Patient Care Team:  Trenton Thomas MD as PCP - General    Complete Medical and Social history reviewed with patient, outlined below.    Patient Active Problem List   Diagnosis     Hypertension goal BP (blood pressure) < 130/80     Hyperlipidemia LDL goal <130     Advanced directives, counseling/discussion     S/P TURP (status post transurethral resection of prostate)       Past Medical History:   Diagnosis Date     BPH      Sleep apnea        Past Surgical History:   Procedure Laterality Date     C NONSPECIFIC PROCEDURE      lumbar surgery     CYSTOSCOPY, TRANSURETHRAL RESECTION (TUR) PROSTATE, COMBINED N/A 2016    Procedure: COMBINED CYSTOSCOPY, TRANSURETHRAL RESECTION (TUR) PROSTATE;  Surgeon: Pepe Srinivasan MD;  Location: [facility] OR       Family History   Problem Relation Age of Onset     HEART DISEASE Mother      alive age (1924)     DIABETES Father       age 84     CEREBROVASCULAR DISEASE Father      CEREBROVASCULAR DISEASE Brother      alive age 57     Family History Negative Sister      alive age 60     Family History Negative Sister      alive age 55     Family History Negative Sister      alive age 52       Social History   Substance Use Topics     Smoking status: Former Smoker     Quit date: 3/6/1973     Smokeless tobacco: Never Used     Alcohol use Yes      Comment: occasional       Diet: regular, low salt/low fat  Physical Activity: generally inactive  Depression Screen:    Over the past 2 weeks, patient has felt down, depressed, or hopeless:  No    Over the past 2 weeks, patient has felt little interest or pleasure in doing things: No    Functional ability/Safety screen:  Up and go test (able to get up  and walk longer than 30 seconds): Passed  Patient needs assistance with: nothing  Patient's home has the following possible safety concerns: none identified  Patient has concerns about his hearing:  No  Cognitive Screen  Patient repeats three objects (ball, flag, tree)      Clock drawing test: NORMAL  Recalls three objects after 3 minutes (ball,flag,tree):                                                                                               recalls 3 objects (3 points)      Review Of Systems  Skin: negative  Eyes: negative  Ears/Nose/Throat: negative  Respiratory: No shortness of breath, dyspnea on exertion, cough, or hemoptysis  Cardiovascular: negative  Gastrointestinal: negative  Genitourinary: negative  Musculoskeletal: negative  Neurologic: negative  Psychiatric: negative  Hematologic/Lymphatic/Immunologic: negative  Endocrine: negative     Physical Exam:  /68 (BP Location: Right arm, Patient Position: Chair, Cuff Size: Adult Large)  Pulse 56  Wt 215 lb (97.5 kg)  SpO2 97%  BMI 34.7 kg/m2   Body mass index is 34.7 kg/(m^2).                GENERAL APPEARANCE: healthy, alert and no distress  PSYCH: Affect normal/bright.  Mentation within normal limits.  EYES: conjunctiva clear  HENT: ear canals and TM's normal.  Nose and mouth without ulcers, erythema or lesions  NECK: supple, nontender, no lymphadenopathy  RESP: lungs clear to auscultation - no rales, rhonchi or wheezes  CV: regular rates and rhythm, normal S1 S2, no murmur noted  ABDOMEN:  soft, nontender, no HSM or masses and bowel sounds normal  SKIN: no suspicious lesions or rashes  NEURO: Normal strength and tone,  normal speech and mentation  Extremities: no peripheral edema or tenderness, peripheral pulses normal  MS: extremities normal- no gross deformities noted, no erythema, FROM noted in all extremities  PSYCH: mentation appears normal  LYMPHATICS: no cervical adenopathy    End of Life Planning:   Patient currently has an advanced  directive: Yes.  Practitioner is supportive of decision. Full code    Education/Counseling:   Based on review of the above information, the following items were addressed:      Obesity - appropriate counseling on diet, exercise, etc.    Appropriate preventive services were discussed with this patient, including applicable screening as appropriate for cardiovascular disease, diabetes, osteopenia/osteoporosis, and glaucoma.  As appropriate for age/gender, discussed screening for colorectal cancer, prostate cancer, breast cancer, and cervical cancer.   Checklist reviewing preventive services available has been given to the patient.         Component      Latest Ref Rng & Units 3/10/2017 3/12/2018   WBC      4.0 - 11.0 10e9/L 7.8 6.5   RBC Count      4.4 - 5.9 10e12/L 4.58 4.67   Hemoglobin      13.3 - 17.7 g/dL 14.8 14.8   Hematocrit      40.0 - 53.0 % 43.5 44.9   MCV      78 - 100 fl 95 96   MCH      26.5 - 33.0 pg 32.3 31.7   MCHC      31.5 - 36.5 g/dL 34.0 33.0   RDW      10.0 - 15.0 % 12.5 12.6   Platelet Count      150 - 450 10e9/L 158 152   Diff Method       Automated Method Automated Method   % Neutrophils      % 64.4 64.0   % Lymphocytes      % 23.3 23.6   % Monocytes      % 7.5 7.7   % Eosinophils      % 4.4 4.4   % Basophils      % 0.4 0.3   Absolute Neutrophil      1.6 - 8.3 10e9/L 5.0 4.2   Absolute Lymphocytes      0.8 - 5.3 10e9/L 1.8 1.5   Absolute Monocytes      0.0 - 1.3 10e9/L 0.6 0.5   Absolute Eosinophils      0.0 - 0.7 10e9/L 0.3 0.3   Absolute Basophils      0.0 - 0.2 10e9/L 0.0 0.0   Sodium      133 - 144 mmol/L 140 140   Potassium      3.4 - 5.3 mmol/L 3.6 4.3   Chloride      94 - 109 mmol/L 106 106   Carbon Dioxide      20 - 32 mmol/L 29 30   Anion Gap      3 - 14 mmol/L 5 4   Glucose      70 - 99 mg/dL 99 108 (H)   Urea Nitrogen      7 - 30 mg/dL 16 14   Creatinine      0.66 - 1.25 mg/dL 1.11 1.22   GFR Estimate      >60 mL/min/1.7m2 65 58 (L)   GFR Estimate If Black      >60 mL/min/1.7m2 79  70   Calcium      8.5 - 10.1 mg/dL 8.8 8.8   Bilirubin Direct      0.0 - 0.2 mg/dL 0.2 0.2   Bilirubin Total      0.2 - 1.3 mg/dL 0.5 0.5   Albumin      3.4 - 5.0 g/dL 3.8 3.7   Protein Total      6.8 - 8.8 g/dL 6.7 (L) 6.9   Alkaline Phosphatase      40 - 150 U/L 49 49   ALT      0 - 70 U/L 27 26   AST      0 - 45 U/L 19 22   Cholesterol      <200 mg/dL 101 110   Triglycerides      <150 mg/dL 78 106   HDL Cholesterol      >39 mg/dL 37 (L) 38 (L)   LDL Cholesterol Calculated      <100 mg/dL 48 51   Non HDL Cholesterol      <130 mg/dL 64 72   T4 Free      0.76 - 1.46 ng/dL 0.81 0.78   TSH      0.40 - 4.00 mU/L 3.07 2.07   PSA      0 - 4 ug/L 3.03 3.16   Free T3      2.3 - 4.2 pg/mL  2.4   Hemoglobin A1C      4.3 - 6.0 %  5.5       (Z00.00) Medicare annual wellness visit, subsequent  (primary encounter diagnosis)  Comment: needs a colonoscopy, RTC one year after labs  Plan: GASTROENTEROLOGY ADULT REF PROCEDURE ONLY         Other; (Dr. Vitor King, [phone])           (E78.5) Hyperlipidemia LDL goal <130  Comment: at goal  Plan: OFFICE/OUTPT VISIT,EST,LEVL III, simvastatin         (ZOCOR) 20 MG tablet           (N40.0) Benign non-nodular prostatic hyperplasia without lower urinary tract symptoms, S/P TURP  Comment: doing well  Plan: OFFICE/OUTPT VISIT,EST,LEVL III, terazosin         (HYTRIN) 10 MG capsule            Greater than one half the 15 minutes not spent on preventive care during this visit was spent providing aducation and counselling regarding item(s) # 2 onward as delineated above.

## 2018-03-20 NOTE — MR AVS SNAPSHOT
After Visit Summary   3/20/2018    Candelario Ruth    MRN: [id number]           Patient Information     Date Of Birth          1945        Visit Information        Provider Department      3/20/2018 1:00 PM Trenton Thomas MD [facility]      Today's Diagnoses     Medicare annual wellness visit, subsequent    -  1    Hyperlipidemia LDL goal <130        Benign non-nodular prostatic hyperplasia without lower urinary tract symptoms, S/P TURP        IFG (impaired fasting glucose)           Follow-ups after your visit        Additional Services     GASTROENTEROLOGY ADULT REF PROCEDURE ONLY Other; (Dr. Vitor King, [phone])       Last Lab Result: Creatinine (mg/dL)       Date                     Value                 03/12/2018               1.22             ----------  Body mass index is 34.7 kg/(m^2).     Needed:  No  Language:  English    Patient will be contacted to schedule procedure.     Please be aware that coverage of these services is subject to the terms and limitations of your health insurance plan.  Call member services at your health plan with any benefit or coverage questions.  Any procedures must be performed at a [place] facility OR coordinated by your clinic's referral office.    Please bring the following with you to your appointment:    (1) Any X-Rays, CTs or MRIs which have been performed.  Contact the facility where they were done to arrange for  prior to your scheduled appointment.    (2) List of current medications   (3) This referral request   (4) Any documents/labs given to you for this referral                  Future tests that were ordered for you today     Open Future Orders        Priority Expected Expires Ordered    Follow-Up with Vascular Medicine Routine 3/19/2019 3/19/2019 3/19/2018    CBC with platelets differential Routine 3/19/2019 3/19/2019 3/19/2018    T3 Free  "Routine 3/19/2019 3/19/2019 3/19/2018    T4 free Routine 3/19/2019 3/19/2019 3/19/2018    TSH Routine 3/19/2019 3/19/2019 3/19/2018    Basic metabolic panel Routine 3/19/2019 3/19/2019 3/19/2018    Hepatic panel Routine 3/19/2019 3/19/2019 3/19/2018    Lipid Profile Routine 3/19/2019 3/19/2019 3/19/2018    Prostate spec antigen screen Routine 3/19/2019 3/19/2019 3/19/2018            Who to contact     If you have questions or need follow up information about today's clinic visit or your schedule please contact [facility] directly at [phone].  Normal or non-critical lab and imaging results will be communicated to you by MyChart, letter or phone within 4 business days after the clinic has received the results. If you do not hear from us within 7 days, please contact the clinic through MyChart or phone. If you have a critical or abnormal lab result, we will notify you by phone as soon as possible.  Submit refill requests through [employer] or call your pharmacy and they will forward the refill request to us. Please allow 3 business days for your refill to be completed.          Additional Information About Your Visit        [employer] Information     [employer] lets you send messages to your doctor, view your test results, renew your prescriptions, schedule appointments and more. To sign up, go to [email or web address] . Click on \"Log in\" on the left side of the screen, which will take you to the Welcome page. Then click on \"Sign up Now\" on the right side of the page.     You will be asked to enter the access code listed below, as well as some personal information. Please follow the directions to create your username and password.     Your access code is: [email or web address]  Expires: 2018 12:51 PM     Your access code will  in 90 days. If you need help or a new code, please call your [place] clinic or [phone].        Care EveryWhere ID     This is your Care EveryWhere ID. This could be " used by other organizations to access your [place] medical records  [id number]        Your Vitals Were     Pulse Pulse Oximetry BMI (Body Mass Index)             56 97% 34.7 kg/m2          Blood Pressure from Last 3 Encounters:   03/20/18 129/68   05/03/17 116/68   03/17/17 114/58    Weight from Last 3 Encounters:   03/20/18 215 lb (97.5 kg)   05/03/17 199 lb (90.3 kg)   03/17/17 201 lb (91.2 kg)              We Performed the Following     GASTROENTEROLOGY ADULT REF PROCEDURE ONLY Other; (Dr. Vitor King, [phone])     OFFICE/OUTPT VISIT,EST,LEVL III          Where to get your medicines      These medications were sent to [id number] [place] - [address] [facility]  [address] [place] [address]     Phone:  [phone]     simvastatin 20 MG tablet    terazosin 10 MG capsule          Primary Care Provider Office Phone # Fax #    Sharanjordy Thomas [phone]       [place] [facility] [address]  [place] [address]        Equal Access to Services     DONNIE NICHOLSON AH: Hadii katherine ku hadasho Soomaali, waaxda luqadaha, qaybta kaalmada adeegyada, monique haile. So [facility] [phone].    ATENCIÓN: Si habla español, tiene a sierra disposición servicios gratuitos de asistencia lingüística. Saleemame al [phone].    We comply with applicable federal civil rights laws and [place] laws. We do not discriminate on the basis of race, color, national origin, age, disability, sex, sexual orientation, or gender identity.            Thank you!     Thank you for choosing [facility]  for your care. Our goal is always to provide you with excellent care. Hearing back from our patients is one way we can continue to improve our services. Please take a few minutes to complete the written survey that you may receive in the mail after your visit with us. Thank you!             Your Updated Medication List -  Protect others around you: Learn how to safely use, store and throw away your medicines at www.disposemymeds.org.          This list is accurate as of 3/20/18  1:45 PM.  Always use your most recent med list.                   Brand Name Dispense Instructions for use Diagnosis    simvastatin 20 MG tablet    ZOCOR    90 tablet    Take 1 tablet (20 mg) by mouth At Bedtime    Hyperlipidemia LDL goal <130       terazosin 10 MG capsule    HYTRIN    90 capsule    Take 1 capsule (10 mg) by mouth At Bedtime    Benign non-nodular prostatic hyperplasia without lower urinary tract symptoms

## 2018-03-20 NOTE — TELEPHONE ENCOUNTER
Patient called to report last colonoscopy date  It was 12/19/11  Routed to PCP as RAINER Santacruz, RN, BSN

## 2018-03-20 NOTE — NURSING NOTE
"Chief Complaint   Patient presents with     RECHECK     Fasting labs done 03/12/18       Initial /68 (BP Location: Right arm, Patient Position: Chair, Cuff Size: Adult Large)  Pulse 56  Wt 215 lb (97.5 kg)  SpO2 97%  BMI 34.7 kg/m2 Estimated body mass index is 34.7 kg/(m^2) as calculated from the following:    Height as of 3/17/17: 5' 6\" (1.676 m).    Weight as of this encounter: 215 lb (97.5 kg).  Medication Reconciliation: complete     Ladi Lopez MA   "

## 2019-02-25 LAB
ALBUMIN SERPL-MCNC: 4 G/DL (ref 3.4–5)
ALP SERPL-CCNC: 51 U/L (ref 40–150)
ALT SERPL W P-5'-P-CCNC: 36 U/L (ref 0–70)
ANION GAP SERPL CALCULATED.3IONS-SCNC: 2 MMOL/L (ref 3–14)
AST SERPL W P-5'-P-CCNC: 27 U/L (ref 0–45)
BASOPHILS # BLD AUTO: 0 10E9/L (ref 0–0.2)
BASOPHILS NFR BLD AUTO: 0.4 %
BILIRUB DIRECT SERPL-MCNC: 0.2 MG/DL (ref 0–0.2)
BILIRUB SERPL-MCNC: 0.5 MG/DL (ref 0.2–1.3)
BUN SERPL-MCNC: 15 MG/DL (ref 7–30)
CALCIUM SERPL-MCNC: 9.1 MG/DL (ref 8.5–10.1)
CHLORIDE SERPL-SCNC: 105 MMOL/L (ref 94–109)
CHOLEST SERPL-MCNC: 135 MG/DL
CO2 SERPL-SCNC: 31 MMOL/L (ref 20–32)
CREAT SERPL-MCNC: 1.17 MG/DL (ref 0.66–1.25)
DIFFERENTIAL METHOD BLD: NORMAL
EOSINOPHIL # BLD AUTO: 0.3 10E9/L (ref 0–0.7)
EOSINOPHIL NFR BLD AUTO: 4.4 %
ERYTHROCYTE [DISTWIDTH] IN BLOOD BY AUTOMATED COUNT: 12.7 % (ref 10–15)
GFR SERPL CREATININE-BSD FRML MDRD: 61 ML/MIN/{1.73_M2}
GLUCOSE SERPL-MCNC: 118 MG/DL (ref 70–99)
HCT VFR BLD AUTO: 44.9 % (ref 40–53)
HDLC SERPL-MCNC: 41 MG/DL
HGB BLD-MCNC: 15.3 G/DL (ref 13.3–17.7)
LDLC SERPL CALC-MCNC: 73 MG/DL
LYMPHOCYTES # BLD AUTO: 1.8 10E9/L (ref 0.8–5.3)
LYMPHOCYTES NFR BLD AUTO: 23.8 %
MCH RBC QN AUTO: 31.9 PG (ref 26.5–33)
MCHC RBC AUTO-ENTMCNC: 34.1 G/DL (ref 31.5–36.5)
MCV RBC AUTO: 94 FL (ref 78–100)
MONOCYTES # BLD AUTO: 0.6 10E9/L (ref 0–1.3)
MONOCYTES NFR BLD AUTO: 8.2 %
NEUTROPHILS # BLD AUTO: 4.7 10E9/L (ref 1.6–8.3)
NEUTROPHILS NFR BLD AUTO: 63.2 %
NONHDLC SERPL-MCNC: 94 MG/DL
PLATELET # BLD AUTO: 172 10E9/L (ref 150–450)
POTASSIUM SERPL-SCNC: 3.8 MMOL/L (ref 3.4–5.3)
PROT SERPL-MCNC: 7.1 G/DL (ref 6.8–8.8)
PSA SERPL-ACNC: 3.55 UG/L (ref 0–4)
RBC # BLD AUTO: 4.8 10E12/L (ref 4.4–5.9)
SODIUM SERPL-SCNC: 138 MMOL/L (ref 133–144)
T3FREE SERPL-MCNC: 2.8 PG/ML (ref 2.3–4.2)
T4 FREE SERPL-MCNC: 0.76 NG/DL (ref 0.76–1.46)
TRIGL SERPL-MCNC: 106 MG/DL
TSH SERPL DL<=0.005 MIU/L-ACNC: 3.5 MU/L (ref 0.4–4)
WBC # BLD AUTO: 7.4 10E9/L (ref 4–11)

## 2019-02-25 PROCEDURE — 84443 ASSAY THYROID STIM HORMONE: CPT | Performed by: INTERNAL MEDICINE

## 2019-02-25 PROCEDURE — G0103 PSA SCREENING: HCPCS | Performed by: INTERNAL MEDICINE

## 2019-02-25 PROCEDURE — 80076 HEPATIC FUNCTION PANEL: CPT | Performed by: INTERNAL MEDICINE

## 2019-02-25 PROCEDURE — 80048 BASIC METABOLIC PNL TOTAL CA: CPT | Performed by: INTERNAL MEDICINE

## 2019-02-25 PROCEDURE — 80061 LIPID PANEL: CPT | Performed by: INTERNAL MEDICINE

## 2019-02-25 PROCEDURE — 36415 COLL VENOUS BLD VENIPUNCTURE: CPT | Performed by: INTERNAL MEDICINE

## 2019-02-25 PROCEDURE — 85025 COMPLETE CBC W/AUTO DIFF WBC: CPT | Performed by: INTERNAL MEDICINE

## 2019-02-25 PROCEDURE — 84439 ASSAY OF FREE THYROXINE: CPT | Performed by: INTERNAL MEDICINE

## 2019-02-25 PROCEDURE — 84481 FREE ASSAY (FT-3): CPT | Performed by: INTERNAL MEDICINE

## 2019-03-04 ENCOUNTER — OFFICE VISIT (OUTPATIENT)
Dept: OTHER | Facility: CLINIC | Age: 74
End: 2019-03-04
Attending: INTERNAL MEDICINE
Payer: MEDICARE

## 2019-03-04 VITALS
OXYGEN SATURATION: 98 % | HEART RATE: 81 BPM | WEIGHT: 225 LBS | RESPIRATION RATE: 18 BRPM | DIASTOLIC BLOOD PRESSURE: 68 MMHG | SYSTOLIC BLOOD PRESSURE: 128 MMHG | BODY MASS INDEX: 37.49 KG/M2 | HEIGHT: 65 IN

## 2019-03-04 DIAGNOSIS — R73.01 IFG (IMPAIRED FASTING GLUCOSE): ICD-10-CM

## 2019-03-04 DIAGNOSIS — Z00.00 MEDICARE ANNUAL WELLNESS VISIT, SUBSEQUENT: Primary | ICD-10-CM

## 2019-03-04 DIAGNOSIS — N40.0 BENIGN NON-NODULAR PROSTATIC HYPERPLASIA WITHOUT LOWER URINARY TRACT SYMPTOMS: ICD-10-CM

## 2019-03-04 DIAGNOSIS — Z12.5 SCREENING FOR PROSTATE CANCER: ICD-10-CM

## 2019-03-04 DIAGNOSIS — E78.5 HYPERLIPIDEMIA LDL GOAL <130: ICD-10-CM

## 2019-03-04 PROCEDURE — G0439 PPPS, SUBSEQ VISIT: HCPCS | Mod: ZP | Performed by: INTERNAL MEDICINE

## 2019-03-04 PROCEDURE — G0463 HOSPITAL OUTPT CLINIC VISIT: HCPCS

## 2019-03-04 PROCEDURE — 99213 OFFICE O/P EST LOW 20 MIN: CPT | Mod: 25 | Performed by: INTERNAL MEDICINE

## 2019-03-04 RX ORDER — SIMVASTATIN 20 MG
20 TABLET ORAL AT BEDTIME
Qty: 90 TABLET | Refills: 3 | Status: SHIPPED | OUTPATIENT
Start: 2019-03-04 | End: 2020-04-28

## 2019-03-04 RX ORDER — TERAZOSIN 10 MG/1
10 CAPSULE ORAL AT BEDTIME
Qty: 90 CAPSULE | Refills: 3 | Status: SHIPPED | OUTPATIENT
Start: 2019-03-04 | End: 2020-04-21

## 2019-03-04 ASSESSMENT — MIFFLIN-ST. JEOR: SCORE: 1692.47

## 2019-03-04 NOTE — PROGRESS NOTES
"Candelario Ruth is a 73 year old male who presents for:  Chief Complaint   Patient presents with     RECHECK     Return-1 year follow up-labs        Vitals:    Vitals:    03/04/19 1308 03/04/19 1310   BP: 139/76 145/77   BP Location: Right arm Right arm   Patient Position: Chair Chair   Cuff Size: Adult Regular Adult Regular   Pulse: 81    Resp: 18    SpO2: 98%    Weight: 225 lb (102.1 kg)    Height: 5' 5\" (1.651 m)        BMI:  Estimated body mass index is 37.44 kg/m  as calculated from the following:    Height as of this encounter: 5' 5\" (1.651 m).    Weight as of this encounter: 225 lb (102.1 kg).    Pain Score:  Data Unavailable        Yash Watkins    "

## 2019-03-04 NOTE — PROGRESS NOTES
Candelario Ruth is a 73 year old male who presents for        Medicare annual wellness visit, subsequent  Hyperlipidemia LDL goal <130  Benign non-nodular prostatic hyperplasia without lower urinary tract symptoms  IFG (impaired fasting glucose)       Review Of Systems  Skin: negative  Eyes: negative  Ears/Nose/Throat: negative  Respiratory: No shortness of breath, dyspnea on exertion, cough, or hemoptysis  Cardiovascular: negative  Gastrointestinal: negative  Genitourinary: negative  Musculoskeletal: negative  Neurologic: negative  Psychiatric: negative  Hematologic/Lymphatic/Immunologic: negative  Endocrine: negative     Current providers caring for this patient include:  Patient Care Team:  Trenton Thomas MD as PCP - General    Complete Medical and Social history reviewed with patient, outlined below.    Patient Active Problem List   Diagnosis     Hypertension goal BP (blood pressure) < 130/80     Hyperlipidemia LDL goal <130     Advanced directives, counseling/discussion     S/P TURP (status post transurethral resection of prostate)       Past Medical History:   Diagnosis Date     BPH      Sleep apnea        Past Surgical History:   Procedure Laterality Date     C NONSPECIFIC PROCEDURE      lumbar surgery     CYSTOSCOPY, TRANSURETHRAL RESECTION (TUR) PROSTATE, COMBINED N/A 2016    Procedure: COMBINED CYSTOSCOPY, TRANSURETHRAL RESECTION (TUR) PROSTATE;  Surgeon: Pepe Srinivasan MD;  Location:  OR       Family History   Problem Relation Age of Onset     Heart Disease Mother         alive age (1924)     Diabetes Father          age 84     Cerebrovascular Disease Father      Cerebrovascular Disease Brother         alive age 57     Family History Negative Sister         alive age 60     Family History Negative Sister         alive age 55     Family History Negative Sister         alive age 52       Social History     Tobacco Use     Smoking status: Former Smoker     Last attempt  "to quit: 3/6/1973     Years since quittin.0     Smokeless tobacco: Never Used   Substance Use Topics     Alcohol use: Yes     Comment: occasional       Diet: regular, low salt/low fat  Physical Activity: generally inactive  Depression Screen:    Over the past 2 weeks, patient has felt down, depressed, or hopeless:  No    Over the past 2 weeks, patient has felt little interest or pleasure in doing things: No    Functional ability/Safety screen:  Up and go test (able to get up and walk longer than 30 seconds): Passed  Patient needs assistance with: nothing  Patient's home has the following possible safety concerns: none identified  Patient has concerns about his hearing:  No  Cognitive Screen  Patient repeats three objects (ball, flag, tree)      Clock drawing test:   NORMAL  Recalls three objects after 3 minutes (ball,flag,tree):                                                                                               recalls 3 objects (3 points)    Physical Exam:  /77 (BP Location: Right arm, Patient Position: Chair, Cuff Size: Adult Regular)   Pulse 81   Resp 18   Ht 1.651 m (5' 5\")   Wt 102.1 kg (225 lb)   SpO2 98%   BMI 37.44 kg/m     Body mass index is 37.44 kg/m .           GENERAL APPEARANCE: healthy, alert and no distress  PSYCH: Affect normal/bright.  Mentation within normal limits.  EYES: conjunctiva clear  HENT: ear canals and TM's normal.  Nose and mouth without ulcers, erythema or lesions  NECK: supple, nontender, no lymphadenopathy  RESP: lungs clear to auscultation - no rales, rhonchi or wheezes  CV: regular rates and rhythm, normal S1 S2, no murmur noted  ABDOMEN:  soft, nontender, no HSM or masses and bowel sounds normal  SKIN: no suspicious lesions or rashes  NEURO: Normal strength and tone,  normal speech and mentation  Extremities: no peripheral edema or tenderness, peripheral pulses normal  MS: extremities normal- no gross deformities noted, no erythema, FROM noted in all " extremities  PSYCH: mentation appears normal  LYMPHATICS: no cervical adenopathy    End of Life Planning:   Patient currently has an advanced directive: Yes.  Practitioner is supportive of decision.    Education/Counseling:   Based on review of the above information, the following items were addressed:      Elevated blood pressure - follow-up plans made    Appropriate preventive services were discussed with this patient, including applicable screening as appropriate for cardiovascular disease, diabetes, osteopenia/osteoporosis, and glaucoma.  As appropriate for age/gender, discussed screening for colorectal cancer, prostate cancer, breast cancer, and cervical cancer.   Checklist reviewing preventive services available has been given to the patient.        Component      Latest Ref Rng & Units 3/12/2018 2/25/2019   WBC      4.0 - 11.0 10e9/L 6.5 7.4   RBC Count      4.4 - 5.9 10e12/L 4.67 4.80   Hemoglobin      13.3 - 17.7 g/dL 14.8 15.3   Hematocrit      40.0 - 53.0 % 44.9 44.9   MCV      78 - 100 fl 96 94   MCH      26.5 - 33.0 pg 31.7 31.9   MCHC      31.5 - 36.5 g/dL 33.0 34.1   RDW      10.0 - 15.0 % 12.6 12.7   Platelet Count      150 - 450 10e9/L 152 172   Diff Method       Automated Method Automated Method   % Neutrophils      % 64.0 63.2   % Lymphocytes      % 23.6 23.8   % Monocytes      % 7.7 8.2   % Eosinophils      % 4.4 4.4   % Basophils      % 0.3 0.4   Absolute Neutrophil      1.6 - 8.3 10e9/L 4.2 4.7   Absolute Lymphocytes      0.8 - 5.3 10e9/L 1.5 1.8   Absolute Monocytes      0.0 - 1.3 10e9/L 0.5 0.6   Absolute Eosinophils      0.0 - 0.7 10e9/L 0.3 0.3   Absolute Basophils      0.0 - 0.2 10e9/L 0.0 0.0   Sodium      133 - 144 mmol/L 140 138   Potassium      3.4 - 5.3 mmol/L 4.3 3.8   Chloride      94 - 109 mmol/L 106 105   Carbon Dioxide      20 - 32 mmol/L 30 31   Anion Gap      3 - 14 mmol/L 4 2 (L)   Glucose      70 - 99 mg/dL 108 (H) 118 (H)   Urea Nitrogen      7 - 30 mg/dL 14 15    Creatinine      0.66 - 1.25 mg/dL 1.22 1.17   GFR Estimate      >60 mL/min/1.73:m2 58 (L) 61   GFR Estimate If Black      >60 mL/min/1.73:m2 70 71   Calcium      8.5 - 10.1 mg/dL 8.8 9.1   Bilirubin Direct      0.0 - 0.2 mg/dL 0.2 0.2   Bilirubin Total      0.2 - 1.3 mg/dL 0.5 0.5   Albumin      3.4 - 5.0 g/dL 3.7 4.0   Protein Total      6.8 - 8.8 g/dL 6.9 7.1   Alkaline Phosphatase      40 - 150 U/L 49 51   ALT      0 - 70 U/L 26 36   AST      0 - 45 U/L 22 27   Cholesterol      <200 mg/dL 110 135   Triglycerides      <150 mg/dL 106 106   HDL Cholesterol      >39 mg/dL 38 (L) 41   LDL Cholesterol Calculated      <100 mg/dL 51 73   Non HDL Cholesterol      <130 mg/dL 72 94   T4 Free      0.76 - 1.46 ng/dL 0.78 0.76   Free T3      2.3 - 4.2 pg/mL 2.4 2.8   TSH      0.40 - 4.00 mU/L 2.07 3.50   Hemoglobin A1C      4.3 - 6.0 % 5.5    PSA      0 - 4 ug/L 3.16 3.55       (Z00.00) Medicare annual wellness visit, subsequent  (primary encounter diagnosis)  Comment: due for oclonosocpy in 2021  Plan: Follow-Up with Vascular Medicine, CBC with         platelets differential, T3 Free, T4 free, TSH,         Basic metabolic panel, Hepatic panel, Lipid         Profile, Hemoglobin A1c, Albumin Random Urine         Quantitative with Creat Ratio, Prostate spec         antigen screen           (E78.5) Hyperlipidemia LDL goal <130  Comment: at goal  Plan: simvastatin (ZOCOR) 20 MG tablet, Follow-Up         with Vascular Medicine, T3 Free, T4 free, TSH,         Hepatic panel, Lipid Profile, OFFICE/OUTPT         VISIT,EST,LEVL III           (N40.0) Benign non-nodular prostatic hyperplasia without lower urinary tract symptoms, S/P TURP  Comment: doing well  Plan: terazosin (HYTRIN) 10 MG capsule, Follow-Up         with Vascular Medicine, Basic metabolic panel,         OFFICE/OUTPT VISIT,EST,LEVL III           (R73.01) IFG (impaired fasting glucose)  Comment: avoid CHO  Plan: Follow-Up with Vascular Medicine, CBC with         platelets  differential, Hemoglobin A1c, Albumin        Random Urine Quantitative with Creat Ratio,         OFFICE/OUTPT VISIT,EST,LEVL III           (Z12.5) Screening for prostate cancer  Comment: check in one year  Plan: Follow-Up with Vascular Medicine, Prostate spec        antigen screen, OFFICE/OUTPT VISIT,EST,LEVL III           Greater than one half the 15 minutes not spent on preventive care during this visit was spent providing aducation and counselling regarding item(s) # 2 onward as delineated above.

## 2019-03-29 ENCOUNTER — TRANSFERRED RECORDS (OUTPATIENT)
Dept: HEALTH INFORMATION MANAGEMENT | Facility: CLINIC | Age: 74
End: 2019-03-29

## 2019-03-29 ENCOUNTER — TELEPHONE (OUTPATIENT)
Dept: OTHER | Facility: CLINIC | Age: 74
End: 2019-03-29

## 2019-03-29 NOTE — TELEPHONE ENCOUNTER
Candelario stopped by the clinic with copies of the EOB and check his ins co sent to him for a visit with Dr Thomas. His [employer] is now out of network and will reimburse him this way in future, where they cut the check to him and he pays our clinic. We scanned the EOB into his media tab, and I gave him the phone number for billing. He can call them when he gets his bill and ask how it works (if there is a write off amt, if they need the EOB, etc.). Christine Borrego -  at [facility]

## 2020-04-19 DIAGNOSIS — N40.0 BENIGN NON-NODULAR PROSTATIC HYPERPLASIA WITHOUT LOWER URINARY TRACT SYMPTOMS: ICD-10-CM

## 2020-04-20 NOTE — TELEPHONE ENCOUNTER
Unable to refill per FM protocol.  Med and pharm loaded.    Maria G MACN, RN    [facility]  [facility]ascular [facility]  Office: [phone]  Fax: [phone]

## 2020-04-21 RX ORDER — TERAZOSIN 10 MG/1
CAPSULE ORAL
Qty: 90 CAPSULE | Refills: 3 | Status: SHIPPED | OUTPATIENT
Start: 2020-04-21 | End: 2020-09-23

## 2020-04-28 DIAGNOSIS — E78.5 HYPERLIPIDEMIA LDL GOAL <130: ICD-10-CM

## 2020-04-28 RX ORDER — SIMVASTATIN 20 MG
TABLET ORAL
Qty: 90 TABLET | Refills: 3 | Status: SHIPPED | OUTPATIENT
Start: 2020-04-28 | End: 2020-09-23

## 2020-04-28 NOTE — TELEPHONE ENCOUNTER
simvastatin (ZOCOR) 20 MG tablet  Last Written Prescription Date:  3/4/19  Last Fill Quantity: 90,  # refills: 3   Last office visit: 3/4/19    Patient due for annual exam.  90 day supply loaded in light of COVID-19 pandemic scheduling protocol    [facility] Protocol [id number]/28/2020 03:32 AM   LDL on file in past 12 months Protocol Details    Recent (12 mo) or future (30 days) visit within the authorizing provider's specialty      Routing to covering provider for approval.    Vero Ruiz RN BSN  [facility]  [phone]

## 2020-05-05 DIAGNOSIS — Z12.5 SCREENING FOR PROSTATE CANCER: ICD-10-CM

## 2020-05-05 DIAGNOSIS — E78.5 HYPERLIPIDEMIA LDL GOAL <130: ICD-10-CM

## 2020-05-05 DIAGNOSIS — R73.01 IFG (IMPAIRED FASTING GLUCOSE): ICD-10-CM

## 2020-05-05 DIAGNOSIS — N40.0 BENIGN NON-NODULAR PROSTATIC HYPERPLASIA WITHOUT LOWER URINARY TRACT SYMPTOMS: ICD-10-CM

## 2020-05-05 DIAGNOSIS — Z00.00 MEDICARE ANNUAL WELLNESS VISIT, SUBSEQUENT: ICD-10-CM

## 2020-05-05 LAB
ALBUMIN SERPL-MCNC: 3.6 G/DL (ref 3.4–5)
ALP SERPL-CCNC: 52 U/L (ref 40–150)
ALT SERPL W P-5'-P-CCNC: 28 U/L (ref 0–70)
ANION GAP SERPL CALCULATED.3IONS-SCNC: 7 MMOL/L (ref 3–14)
AST SERPL W P-5'-P-CCNC: 17 U/L (ref 0–45)
BASOPHILS # BLD AUTO: 0 10E9/L (ref 0–0.2)
BASOPHILS NFR BLD AUTO: 0.3 %
BILIRUB DIRECT SERPL-MCNC: 0.1 MG/DL (ref 0–0.2)
BILIRUB SERPL-MCNC: 0.4 MG/DL (ref 0.2–1.3)
BUN SERPL-MCNC: 13 MG/DL (ref 7–30)
CALCIUM SERPL-MCNC: 8.7 MG/DL (ref 8.5–10.1)
CHLORIDE SERPL-SCNC: 106 MMOL/L (ref 94–109)
CHOLEST SERPL-MCNC: 125 MG/DL
CO2 SERPL-SCNC: 27 MMOL/L (ref 20–32)
CREAT SERPL-MCNC: 1.06 MG/DL (ref 0.66–1.25)
CREAT UR-MCNC: 167 MG/DL
DIFFERENTIAL METHOD BLD: NORMAL
EOSINOPHIL # BLD AUTO: 0.3 10E9/L (ref 0–0.7)
EOSINOPHIL NFR BLD AUTO: 4.3 %
ERYTHROCYTE [DISTWIDTH] IN BLOOD BY AUTOMATED COUNT: 12.6 % (ref 10–15)
GFR SERPL CREATININE-BSD FRML MDRD: 68 ML/MIN/{1.73_M2}
GLUCOSE SERPL-MCNC: 110 MG/DL (ref 70–99)
HBA1C MFR BLD: 5.5 % (ref 0–5.6)
HCT VFR BLD AUTO: 45.1 % (ref 40–53)
HDLC SERPL-MCNC: 39 MG/DL
HGB BLD-MCNC: 15.2 G/DL (ref 13.3–17.7)
LDLC SERPL CALC-MCNC: 67 MG/DL
LYMPHOCYTES # BLD AUTO: 1.6 10E9/L (ref 0.8–5.3)
LYMPHOCYTES NFR BLD AUTO: 22 %
MCH RBC QN AUTO: 31.9 PG (ref 26.5–33)
MCHC RBC AUTO-ENTMCNC: 33.7 G/DL (ref 31.5–36.5)
MCV RBC AUTO: 95 FL (ref 78–100)
MICROALBUMIN UR-MCNC: 18 MG/L
MICROALBUMIN/CREAT UR: 10.66 MG/G CR (ref 0–17)
MONOCYTES # BLD AUTO: 0.6 10E9/L (ref 0–1.3)
MONOCYTES NFR BLD AUTO: 7.8 %
NEUTROPHILS # BLD AUTO: 4.9 10E9/L (ref 1.6–8.3)
NEUTROPHILS NFR BLD AUTO: 65.6 %
NONHDLC SERPL-MCNC: 86 MG/DL
PLATELET # BLD AUTO: 177 10E9/L (ref 150–450)
POTASSIUM SERPL-SCNC: 3.9 MMOL/L (ref 3.4–5.3)
PROT SERPL-MCNC: 7.2 G/DL (ref 6.8–8.8)
PSA SERPL-ACNC: 3.98 UG/L (ref 0–4)
RBC # BLD AUTO: 4.76 10E12/L (ref 4.4–5.9)
SODIUM SERPL-SCNC: 140 MMOL/L (ref 133–144)
T3FREE SERPL-MCNC: 2.8 PG/ML (ref 2.3–4.2)
T4 FREE SERPL-MCNC: 0.86 NG/DL (ref 0.76–1.46)
TRIGL SERPL-MCNC: 94 MG/DL
TSH SERPL DL<=0.005 MIU/L-ACNC: 2.86 MU/L (ref 0.4–4)
WBC # BLD AUTO: 7.4 10E9/L (ref 4–11)

## 2020-05-05 PROCEDURE — 85025 COMPLETE CBC W/AUTO DIFF WBC: CPT | Performed by: FAMILY MEDICINE

## 2020-05-05 PROCEDURE — 82043 UR ALBUMIN QUANTITATIVE: CPT | Performed by: FAMILY MEDICINE

## 2020-05-05 PROCEDURE — 80076 HEPATIC FUNCTION PANEL: CPT | Performed by: FAMILY MEDICINE

## 2020-05-05 PROCEDURE — 84481 FREE ASSAY (FT-3): CPT | Performed by: INTERNAL MEDICINE

## 2020-05-05 PROCEDURE — 36415 COLL VENOUS BLD VENIPUNCTURE: CPT | Performed by: FAMILY MEDICINE

## 2020-05-05 PROCEDURE — 84443 ASSAY THYROID STIM HORMONE: CPT | Performed by: FAMILY MEDICINE

## 2020-05-05 PROCEDURE — 80061 LIPID PANEL: CPT | Performed by: FAMILY MEDICINE

## 2020-05-05 PROCEDURE — 80048 BASIC METABOLIC PNL TOTAL CA: CPT | Performed by: FAMILY MEDICINE

## 2020-05-05 PROCEDURE — 83036 HEMOGLOBIN GLYCOSYLATED A1C: CPT | Performed by: FAMILY MEDICINE

## 2020-05-05 PROCEDURE — G0103 PSA SCREENING: HCPCS | Performed by: FAMILY MEDICINE

## 2020-05-05 PROCEDURE — 84439 ASSAY OF FREE THYROXINE: CPT | Performed by: FAMILY MEDICINE

## 2020-05-12 ENCOUNTER — VIRTUAL VISIT (OUTPATIENT)
Dept: OTHER | Facility: CLINIC | Age: 75
End: 2020-05-12
Attending: INTERNAL MEDICINE
Payer: COMMERCIAL

## 2020-05-12 DIAGNOSIS — E78.5 HYPERLIPIDEMIA LDL GOAL <130: ICD-10-CM

## 2020-05-12 DIAGNOSIS — Z12.5 SCREENING FOR PROSTATE CANCER: ICD-10-CM

## 2020-05-12 DIAGNOSIS — N40.0 BENIGN NON-NODULAR PROSTATIC HYPERPLASIA WITHOUT LOWER URINARY TRACT SYMPTOMS: ICD-10-CM

## 2020-05-12 DIAGNOSIS — R73.01 IFG (IMPAIRED FASTING GLUCOSE): ICD-10-CM

## 2020-05-12 PROCEDURE — 99214 OFFICE O/P EST MOD 30 MIN: CPT | Mod: 95 | Performed by: INTERNAL MEDICINE

## 2020-05-12 NOTE — PROGRESS NOTES
"Candelario Ruth is a 75 year old male who is being evaluated via a billable telephone visit.      The patient has been notified of following:     \"This telephone visit will be conducted via a call between you and your physician/provider. We have found that certain health care needs can be provided without the need for a physical exam.  This service lets us provide the care you need with a short phone conversation.  If a prescription is necessary we can send it directly to your pharmacy.  If lab work is needed we can place an order for that and you can then stop by our lab to have the test done at a later time.    Telephone visits are billed at different rates depending on your insurance coverage. During this emergency period, for some insurers they may be billed the same as an in-person visit.  Please reach out to your insurance provider with any questions.    If during the course of the call the physician/provider feels a telephone visit is not appropriate, you will not be charged for this service.\"    Patient has given verbal consent for Telephone visit?  Yes    What phone number would you like to be contacted at? [phone]    How would you like to obtain your AVS? Mail a copy    Phone call duration: 25 minutes    Trenton Thomas MD         "

## 2020-05-12 NOTE — PATIENT INSTRUCTIONS
Your cholesterol levels are at goal, please continue taking your simvastatin.  Continue to avoid excess sugars and carbohydrates.  We will check your blood sugar control before your next appointment in August.      Please call early August to schedule fasting labs and an office visit one week later.    If you have any questions, please feel free to contact me through [employer] or by calling [phone].    Vero Ruiz RN BSN  For Dr. Trenton Thomas  [facility]

## 2020-09-15 DIAGNOSIS — R73.01 IFG (IMPAIRED FASTING GLUCOSE): ICD-10-CM

## 2020-09-15 LAB
ANION GAP SERPL CALCULATED.3IONS-SCNC: 4 MMOL/L (ref 3–14)
BUN SERPL-MCNC: 14 MG/DL (ref 7–30)
CALCIUM SERPL-MCNC: 9.1 MG/DL (ref 8.5–10.1)
CHLORIDE SERPL-SCNC: 106 MMOL/L (ref 94–109)
CO2 SERPL-SCNC: 28 MMOL/L (ref 20–32)
CREAT SERPL-MCNC: 1.33 MG/DL (ref 0.66–1.25)
GFR SERPL CREATININE-BSD FRML MDRD: 52 ML/MIN/{1.73_M2}
GLUCOSE SERPL-MCNC: 100 MG/DL (ref 70–99)
HBA1C MFR BLD: 5.8 % (ref 0–5.6)
POTASSIUM SERPL-SCNC: 3.9 MMOL/L (ref 3.4–5.3)
SODIUM SERPL-SCNC: 138 MMOL/L (ref 133–144)

## 2020-09-15 PROCEDURE — 83036 HEMOGLOBIN GLYCOSYLATED A1C: CPT | Performed by: INTERNAL MEDICINE

## 2020-09-15 PROCEDURE — 36415 COLL VENOUS BLD VENIPUNCTURE: CPT | Performed by: INTERNAL MEDICINE

## 2020-09-15 PROCEDURE — 80048 BASIC METABOLIC PNL TOTAL CA: CPT | Performed by: INTERNAL MEDICINE

## 2020-09-23 ENCOUNTER — OFFICE VISIT (OUTPATIENT)
Dept: OTHER | Facility: CLINIC | Age: 75
End: 2020-09-23
Attending: INTERNAL MEDICINE
Payer: COMMERCIAL

## 2020-09-23 VITALS
HEIGHT: 64 IN | RESPIRATION RATE: 16 BRPM | BODY MASS INDEX: 36.7 KG/M2 | OXYGEN SATURATION: 95 % | HEART RATE: 65 BPM | SYSTOLIC BLOOD PRESSURE: 137 MMHG | DIASTOLIC BLOOD PRESSURE: 76 MMHG | WEIGHT: 215 LBS

## 2020-09-23 DIAGNOSIS — E78.5 HYPERLIPIDEMIA LDL GOAL <130: ICD-10-CM

## 2020-09-23 DIAGNOSIS — Z12.5 SCREENING FOR PROSTATE CANCER: ICD-10-CM

## 2020-09-23 DIAGNOSIS — Z00.00 MEDICARE ANNUAL WELLNESS VISIT, SUBSEQUENT: Primary | ICD-10-CM

## 2020-09-23 DIAGNOSIS — N28.9 RENAL INSUFFICIENCY: ICD-10-CM

## 2020-09-23 DIAGNOSIS — N40.0 BENIGN NON-NODULAR PROSTATIC HYPERPLASIA WITHOUT LOWER URINARY TRACT SYMPTOMS: ICD-10-CM

## 2020-09-23 DIAGNOSIS — R73.01 IFG (IMPAIRED FASTING GLUCOSE): ICD-10-CM

## 2020-09-23 PROCEDURE — 99397 PER PM REEVAL EST PAT 65+ YR: CPT | Mod: ZP | Performed by: INTERNAL MEDICINE

## 2020-09-23 PROCEDURE — 99213 OFFICE O/P EST LOW 20 MIN: CPT | Mod: 25 | Performed by: INTERNAL MEDICINE

## 2020-09-23 PROCEDURE — G0463 HOSPITAL OUTPT CLINIC VISIT: HCPCS

## 2020-09-23 RX ORDER — TERAZOSIN 10 MG/1
CAPSULE ORAL
Qty: 90 CAPSULE | Refills: 3 | Status: SHIPPED | OUTPATIENT
Start: 2020-09-23 | End: 2020-10-30

## 2020-09-23 RX ORDER — SIMVASTATIN 20 MG
20 TABLET ORAL AT BEDTIME
Qty: 90 TABLET | Refills: 3 | Status: SHIPPED | OUTPATIENT
Start: 2020-09-23 | End: 2020-10-30

## 2020-09-23 ASSESSMENT — MIFFLIN-ST. JEOR: SCORE: 1621.23

## 2020-09-23 NOTE — PROGRESS NOTES
"Candelario Ruth is a 75 year old male who presents for:  Chief Complaint   Patient presents with     RECHECK       follow up to virtual visit 5/12/20.  Fasting labs completed 9/15/2020.        Vitals:    Vitals:    09/23/20 1225 09/23/20 1226   BP: (!) 149/81 137/76   BP Location: Right arm Left arm   Patient Position: Chair Chair   Cuff Size: Adult Regular Adult Regular   Pulse: 65    Resp: 16    SpO2: 95%    Weight: 215 lb (97.5 kg)    Height: 5' 4\" (1.626 m)        BMI:  Estimated body mass index is 36.9 kg/m  as calculated from the following:    Height as of this encounter: 5' 4\" (1.626 m).    Weight as of this encounter: 215 lb (97.5 kg).    Pain Score:  Data Unavailable        Yash Watkins CMA    "

## 2020-09-23 NOTE — PROGRESS NOTES
Candelario Ruth is a 75 year old male who presents for        Medicare annual wellness visit, subsequent  Hyperlipidemia LDL goal <130  Benign non-nodular prostatic hyperplasia without lower urinary tract symptoms  IFG (impaired fasting glucose)  Screening for prostate cancer  Renal insufficiency     HPI: Candelario Ruth is a 75 year old male with htn, HLD, IFG who is presenting for annual physical. Labs reveal new renal insufficiency form which the patient is asx.     Review Of Systems  Skin: negative  Eyes: negative  Ears/Nose/Throat: negative  Respiratory: No shortness of breath, dyspnea on exertion, cough, or hemoptysis  Cardiovascular: negative  Gastrointestinal: negative  Genitourinary: negative  Musculoskeletal: negative  Neurologic: negative  Psychiatric: negative  Hematologic/Lymphatic/Immunologic: negative  Endocrine: negative     Current providers caring for this patient include:  Patient Care Team:  Trenton Thomas MD as PCP - General    Complete Medical and Social history reviewed with patient, outlined below.    Patient Active Problem List   Diagnosis     Hypertension goal BP (blood pressure) < 130/80     Hyperlipidemia LDL goal <130     Advanced directives, counseling/discussion     S/P TURP (status post transurethral resection of prostate)       Past Medical History:   Diagnosis Date     BPH      Sleep apnea        Past Surgical History:   Procedure Laterality Date     C NONSPECIFIC PROCEDURE      lumbar surgery     CYSTOSCOPY, TRANSURETHRAL RESECTION (TUR) PROSTATE, COMBINED N/A 2016    Procedure: COMBINED CYSTOSCOPY, TRANSURETHRAL RESECTION (TUR) PROSTATE;  Surgeon: Pepe Srinivasan MD;  Location:  OR       Family History   Problem Relation Age of Onset     Heart Disease Mother         alive age (19[birth year])     Diabetes Father          age 84     Cerebrovascular Disease Father      Cerebrovascular Disease Brother         alive age 57     Family History Negative  "Sister         alive age 60     Family History Negative Sister         alive age 55     Family History Negative Sister         alive age 52       Social History     Tobacco Use     Smoking status: Former Smoker     Last attempt to quit: 3/6/1973     Years since quittin.5     Smokeless tobacco: Never Used   Substance Use Topics     Alcohol use: Yes     Comment: occasional       Diet: regular, low salt/low fat  Physical Activity: active without specific exercise program  Depression Screen:    Over the past 2 weeks, patient has felt down, depressed, or hopeless:  No    Over the past 2 weeks, patient has felt little interest or pleasure in doing things: No    Functional ability/Safety screen:  Up and go test (able to get up and walk longer than 30 seconds): Passed  Patient needs assistance with: nothing  Patient's home has the following possible safety concerns: none identified  Patient has concerns about his hearing:  No  Cognitive Screen  Patient repeats three objects (ball, flag, tree)      Clock drawing test:   NORMAL  Recalls three objects after 3 minutes (ball,flag,tree):                                                                                               recalls 3 objects (3 points)    Physical Exam:  /76 (BP Location: Left arm, Patient Position: Chair, Cuff Size: Adult Regular)   Pulse 65   Resp 16   Ht 1.626 m (5' 4\")   Wt 97.5 kg (215 lb)   SpO2 95%   BMI 36.90 kg/m     Body mass index is 36.9 kg/m .              GENERAL APPEARANCE: healthy, alert and no distress  PSYCH: Affect normal/bright.  Mentation within normal limits.  EYES: conjunctiva clear  HENT: ear canals and TM's normal.  Nose and mouth without ulcers, erythema or lesions  NECK: supple, nontender, no lymphadenopathy  RESP: lungs clear to auscultation - no rales, rhonchi or wheezes  CV: regular rates and rhythm, normal S1 S2, no murmur noted  ABDOMEN:  soft, nontender, no HSM or masses and bowel sounds normal  SKIN: no " suspicious lesions or rashes  NEURO: Normal strength and tone,  normal speech and mentation  Extremities: no peripheral edema or tenderness, peripheral pulses normal  MS: extremities normal- no gross deformities noted, no erythema, FROM noted in all extremities  PSYCH: mentation appears normal  LYMPHATICS: no cervical adenopathy    End of Life Planning:   Patient currently has an advanced directive: Yes.  Practitioner is supportive of decision.    Education/Counseling:   Based on review of the above information, the following items were addressed:      Elevated blood pressure - follow-up plans made    Appropriate preventive services were discussed with this patient, including applicable screening as appropriate for cardiovascular disease, diabetes, osteopenia/osteoporosis, and glaucoma.  As appropriate for age/gender, discussed screening for colorectal cancer, prostate cancer, breast cancer, and cervical cancer.   Checklist reviewing preventive services available has been given to the patient.                     Component      Latest Ref Rng & Units 5/5/2020 9/15/2020   WBC      4.0 - 11.0 10e9/L 7.4    RBC Count      4.4 - 5.9 10e12/L 4.76    Hemoglobin      13.3 - 17.7 g/dL 15.2    Hematocrit      40.0 - 53.0 % 45.1    MCV      78 - 100 fl 95    MCH      26.5 - 33.0 pg 31.9    MCHC      31.5 - 36.5 g/dL 33.7    RDW      10.0 - 15.0 % 12.6    Platelet Count      150 - 450 10e9/L 177    % Neutrophils      % 65.6    % Lymphocytes      % 22.0    % Monocytes      % 7.8    % Eosinophils      % 4.3    % Basophils      % 0.3    Absolute Neutrophil      1.6 - 8.3 10e9/L 4.9    Absolute Lymphocytes      0.8 - 5.3 10e9/L 1.6    Absolute Monocytes      0.0 - 1.3 10e9/L 0.6    Absolute Eosinophils      0.0 - 0.7 10e9/L 0.3    Absolute Basophils      0.0 - 0.2 10e9/L 0.0    Diff Method       Automated Method    Sodium      133 - 144 mmol/L 140 138   Potassium      3.4 - 5.3 mmol/L 3.9 3.9   Chloride      94 - 109 mmol/L 106  106   Carbon Dioxide      20 - 32 mmol/L 27 28   Anion Gap      3 - 14 mmol/L 7 4   Glucose      70 - 99 mg/dL 110 (H) 100 (H)   Urea Nitrogen      7 - 30 mg/dL 13 14   Creatinine      0.66 - 1.25 mg/dL 1.06 1.33 (H)   GFR Estimate      >60 mL/min/1.73:m2 68 52 (L)   GFR Estimate If Black      >60 mL/min/1.73:m2 79 60 (L)   Calcium      8.5 - 10.1 mg/dL 8.7 9.1   Bilirubin Direct      0.0 - 0.2 mg/dL 0.1    Bilirubin Total      0.2 - 1.3 mg/dL 0.4    Albumin      3.4 - 5.0 g/dL 3.6    Protein Total      6.8 - 8.8 g/dL 7.2    Alkaline Phosphatase      40 - 150 U/L 52    ALT      0 - 70 U/L 28    AST      0 - 45 U/L 17    Cholesterol      <200 mg/dL 125    Triglycerides      <150 mg/dL 94    HDL Cholesterol      >39 mg/dL 39 (L)    LDL Cholesterol Calculated      <100 mg/dL 67    Non HDL Cholesterol      <130 mg/dL 86    Creatinine Urine      mg/dL 167    Albumin Urine mg/L      mg/L 18    Albumin Urine mg/g Cr      0 - 17 mg/g Cr 10.66    PSA      0 - 4 ug/L 3.98    Hemoglobin A1C      0 - 5.6 % 5.5 5.8 (H)   TSH      0.40 - 4.00 mU/L 2.86    T4 Free      0.76 - 1.46 ng/dL 0.86    Free T3      2.3 - 4.2 pg/mL 2.8        A/P:    (Z00.00) Medicare annual wellness visit, subsequent  (primary encounter diagnosis)  Comment: doing well  Plan: RTC one year     (E78.5) Hyperlipidemia LDL goal <130  Comment: at goal, no med changes  Plan: OFFICE/OUTPT VISIT,EST,LEVL III, simvastatin         (ZOCOR) 20 MG tablet           (N40.0) Benign non-nodular prostatic hyperplasia without lower urinary tract symptoms, S/P TURP  Comment: doing well, no nocturia  Plan: OFFICE/OUTPT VISIT,EST,LEVL III, terazosin         (HYTRIN) 10 MG capsule           (R73.01) IFG (impaired fasting glucose)  Comment: avoid CHO  Plan: OFFICE/OUTPT VISIT,EST,LEVL III           (Z12.5) Screening for prostate cancer  Comment: PSA WNL in last year  Plan: OFFICE/OUTPT VISIT,JEAN MARIE PIERCE III           (N28.9) Renal insufficiency  Comment: this is new, check the below  in one month to track Cr trend  Plan: OFFICE/OUTPT VISIT,EST,LEVL III, Basic         metabolic panel, Fractional excretion of sodium            Greater than one half the 15 minutes not spent on preventive care during this visit was spent providing aducation and counselling regarding item(s) # 2 onward as delineated above.

## 2020-09-24 ENCOUNTER — TELEPHONE (OUTPATIENT)
Dept: OTHER | Facility: CLINIC | Age: 75
End: 2020-09-24

## 2020-09-24 NOTE — TELEPHONE ENCOUNTER
Follow up to 9/23/20:    Please arrange for:    Urine test & blood draw (Blood specimen for Sodium and Creatinine must be collected within 6 hours of the urine specimen.)    Virtual visit one week later.    Thank you.

## 2020-10-22 NOTE — PROGRESS NOTES
[facility] PCP; #[phone].     Telephone visit.  Patient does not have video capability.    Blood specimen for Sodium and Creatinine collected 10/23/20.   1 month follow up to 09/23/20 visit. [facility] 09/28/20

## 2020-10-23 DIAGNOSIS — N28.9 RENAL INSUFFICIENCY: ICD-10-CM

## 2020-10-23 LAB
ANION GAP SERPL CALCULATED.3IONS-SCNC: 6 MMOL/L (ref 3–14)
BUN SERPL-MCNC: 16 MG/DL (ref 7–30)
CALCIUM SERPL-MCNC: 9.1 MG/DL (ref 8.5–10.1)
CHLORIDE SERPL-SCNC: 107 MMOL/L (ref 94–109)
CO2 SERPL-SCNC: 28 MMOL/L (ref 20–32)
CREAT SERPL-MCNC: 1.17 MG/DL (ref 0.66–1.25)
CREAT UR-MCNC: 230 MG/DL
FRACT EXCRET NA UR+SERPL-RTO: 0.5 %
GFR SERPL CREATININE-BSD FRML MDRD: 60 ML/MIN/{1.73_M2}
GLUCOSE SERPL-MCNC: 98 MG/DL (ref 70–99)
POTASSIUM SERPL-SCNC: 4 MMOL/L (ref 3.4–5.3)
SODIUM SERPL-SCNC: 141 MMOL/L (ref 133–144)
SODIUM UR-SCNC: 146 MMOL/L

## 2020-10-23 PROCEDURE — 80048 BASIC METABOLIC PNL TOTAL CA: CPT | Performed by: INTERNAL MEDICINE

## 2020-10-23 PROCEDURE — 82570 ASSAY OF URINE CREATININE: CPT | Performed by: INTERNAL MEDICINE

## 2020-10-23 PROCEDURE — 84300 ASSAY OF URINE SODIUM: CPT | Performed by: INTERNAL MEDICINE

## 2020-10-23 PROCEDURE — 36415 COLL VENOUS BLD VENIPUNCTURE: CPT | Performed by: INTERNAL MEDICINE

## 2020-10-30 ENCOUNTER — VIRTUAL VISIT (OUTPATIENT)
Dept: OTHER | Facility: CLINIC | Age: 75
End: 2020-10-30
Attending: INTERNAL MEDICINE
Payer: COMMERCIAL

## 2020-10-30 DIAGNOSIS — E78.5 HYPERLIPIDEMIA LDL GOAL <130: ICD-10-CM

## 2020-10-30 DIAGNOSIS — Z12.5 SCREENING FOR PROSTATE CANCER: ICD-10-CM

## 2020-10-30 DIAGNOSIS — Z00.00 MEDICARE ANNUAL WELLNESS VISIT, SUBSEQUENT: ICD-10-CM

## 2020-10-30 DIAGNOSIS — R73.01 IFG (IMPAIRED FASTING GLUCOSE): ICD-10-CM

## 2020-10-30 DIAGNOSIS — N28.9 RENAL INSUFFICIENCY: Primary | ICD-10-CM

## 2020-10-30 DIAGNOSIS — N40.0 BENIGN NON-NODULAR PROSTATIC HYPERPLASIA WITHOUT LOWER URINARY TRACT SYMPTOMS: ICD-10-CM

## 2020-10-30 PROCEDURE — 99214 OFFICE O/P EST MOD 30 MIN: CPT | Mod: 95 | Performed by: INTERNAL MEDICINE

## 2020-10-30 RX ORDER — TERAZOSIN 10 MG/1
CAPSULE ORAL
Qty: 90 CAPSULE | Refills: 3 | Status: SHIPPED | OUTPATIENT
Start: 2020-10-30 | End: 2021-11-04

## 2020-10-30 RX ORDER — SIMVASTATIN 20 MG
20 TABLET ORAL AT BEDTIME
Qty: 90 TABLET | Refills: 3 | Status: SHIPPED | OUTPATIENT
Start: 2020-10-30 | End: 2021-04-26

## 2020-10-30 NOTE — PROGRESS NOTES
"Candelario Ruth is a 75 year old male who is being evaluated via a billable telephone visit.      The patient has been notified of following:     \"This telephone visit will be conducted via a call between you and your physician/provider. We have found that certain health care needs can be provided without the need for a physical exam.  This service lets us provide the care you need with a short phone conversation.  If a prescription is necessary we can send it directly to your pharmacy.  If lab work is needed we can place an order for that and you can then stop by our lab to have the test done at a later time.    Telephone visits are billed at different rates depending on your insurance coverage. During this emergency period, for some insurers they may be billed the same as an in-person visit.  Please reach out to your insurance provider with any questions.    If during the course of the call the physician/provider feels a telephone visit is not appropriate, you will not be charged for this service.\"    Patient has given verbal consent for Telephone visit?  Yes    What phone number would you like to be contacted at? [phone]    How would you like to obtain your AVS? Mail a copy    Phone call visit duration: 25 minutes    Trenotn Thomas MD      "

## 2020-10-30 NOTE — PROGRESS NOTES
Candelario Ruth is a 75 year old male who is presenting at the current time to discuss his diagnosi(es) of        Renal insufficiency  Medicare annual wellness visit, subsequent  IFG (impaired fasting glucose)  Hyperlipidemia LDL goal <130  Screening for prostate cancer     HPI: Candelario Ruth is a 75 year old male with htn, HLD, IFG who at his recent annual physical was found to have new renal insufficiency from which the patient was asx. He presents for repeat labs which reveal this is resolved.     Review Of Systems  Skin: negative  Eyes: negative  Ears/Nose/Throat: negative  Respiratory: No shortness of breath, dyspnea on exertion, cough, or hemoptysis  Cardiovascular: negative  Gastrointestinal: negative  Genitourinary: negative  Musculoskeletal: negative  Neurologic: negative  Psychiatric: negative  Hematologic/Lymphatic/Immunologic: negative  Endocrine: negative     PAST MEDICAL HISTORY:                  Past Medical History:   Diagnosis Date     BPH      Sleep apnea        PAST SURGICAL HISTORY:                  Past Surgical History:   Procedure Laterality Date     CYSTOSCOPY, TRANSURETHRAL RESECTION (TUR) PROSTATE, COMBINED N/A 4/12/2016    Procedure: COMBINED CYSTOSCOPY, TRANSURETHRAL RESECTION (TUR) PROSTATE;  Surgeon: Pepe Srinivasan MD;  Location:  OR     [facility] NONSPECIFIC PROCEDURE  1993    lumbar surgery       CURRENT MEDICATIONS:                  Current Outpatient Medications   Medication Sig Dispense Refill     simvastatin (ZOCOR) 20 MG tablet Take 1 tablet (20 mg) by mouth At Bedtime 90 tablet 3     terazosin (HYTRIN) 10 MG capsule TAKE 1 CAPSULE BY MOUTH AT BEDTIME 90 capsule 3       ALLERGIES:                  Allergies   Allergen Reactions     Amoxicillin      Turns him purple       SOCIAL HISTORY:                  Social History     Socioeconomic History     Marital status:      Spouse name: Andra     Number of children: 3     Years of education: 14     Highest education level:  Not on file   Occupational History     Occupation: assembles [employer]     Employer: [employer]   Social Needs     Financial resource strain: Not on file     Food insecurity     Worry: Not on file     Inability: Not on file     Transportation needs     Medical: Not on file     Non-medical: Not on file   Tobacco Use     Smoking status: Former Smoker     Quit date: 3/6/1973     Years since quittin.6     Smokeless tobacco: Never Used   Substance and Sexual Activity     Alcohol use: Yes     Comment: occasional     Drug use: No     Sexual activity: Not on file   Lifestyle     Physical activity     Days per week: Not on file     Minutes per session: Not on file     Stress: Not on file   Relationships     Social connections     Talks on phone: Not on file     Gets together: Not on file     Attends Christianity service: Not on file     Active member of club or organization: Not on file     Attends meetings of clubs or organizations: Not on file     Relationship status: Not on file     Intimate partner violence     Fear of current or ex partner: Not on file     Emotionally abused: Not on file     Physically abused: Not on file     Forced sexual activity: Not on file   Other Topics Concern     Parent/sibling w/ CABG, MI or angioplasty before 65F 55M? Not Asked   Social History Narrative     Not on file       FAMILY HISTORY:                   Family History   Problem Relation Age of Onset     Heart Disease Mother         alive age (1924)     Diabetes Father          age 84     Cerebrovascular Disease Father      Cerebrovascular Disease Brother         alive age 57     Family History Negative Sister         alive age 60     Family History Negative Sister         alive age 55     Family History Negative Sister         alive age 52         Physical exam was not performed as it was a COVID mandated billable telephone encounter.    Component      Latest Ref Rng & Units 2020 9/15/2020 10/23/2020   Sodium       133 - 144 mmol/L 140 138 141   Potassium      3.4 - 5.3 mmol/L 3.9 3.9 4.0   Chloride      94 - 109 mmol/L 106 106 107   Carbon Dioxide      20 - 32 mmol/L 27 28 28   Anion Gap      3 - 14 mmol/L 7 4 6   Glucose      70 - 99 mg/dL 110 (H) 100 (H) 98   Urea Nitrogen      7 - 30 mg/dL 13 14 16   Creatinine      0.66 - 1.25 mg/dL 1.06 1.33 (H) 1.17   GFR Estimate      >60 mL/min/1.73:m2 68 52 (L) 60 (L)   GFR Estimate If Black      >60 mL/min/1.73:m2 79 60 (L) 70   Calcium      8.5 - 10.1 mg/dL 8.7 9.1 9.1   Creatinine Urine      mg/dL   230   Sodium Urine mmol/L      mmol/L   146   %FENA      %   0.5       A/P:    (N28.9) Renal insufficiency, resolved  Comment: this is resolved  Plan: reassurance, RTC in October , 2021 for annual physical        (Z00.00) Medicare annual wellness visit, subsequent  Comment: obtain the following in one year  Plan: CBC with platelets differential, T3 Free, T4         free, TSH, Basic metabolic panel, Lipid Profile        ==    (R73.01) IFG (impaired fasting glucose)  Comment: avoid CHO  Plan: Hemoglobin A1c, Albumin Random Urine         Quantitative with Creat Ratio           (E78.5) Hyperlipidemia LDL goal <130  Comment: at goal  Plan: Lipid Profile           (Z12.5) Screening for prostate cancer  Comment: check this in one year  Plan: Prostate spec antigen screen                      Greater than one half the 25 minutes total spent on the pt's visit were spent providing education and counselling to the patient regarding the above matters.

## 2021-04-26 DIAGNOSIS — E78.5 HYPERLIPIDEMIA LDL GOAL <130: ICD-10-CM

## 2021-04-26 RX ORDER — SIMVASTATIN 20 MG
20 TABLET ORAL AT BEDTIME
Qty: 90 TABLET | Refills: 1 | Status: SHIPPED | OUTPATIENT
Start: 2021-04-26 | End: 2021-11-04

## 2021-04-26 NOTE — TELEPHONE ENCOUNTER
simvastatin (ZOCOR) 20 MG tablet  Last Written Prescription Date:  10/30/20  Last Fill Quantity: 90,  # refills: 3     Last office visit: 9/23/2020     Remaining refills sent to pharmacy.  Vero Ruiz RN BSN  [facility]  [phone]

## 2021-10-24 ENCOUNTER — HEALTH MAINTENANCE LETTER (OUTPATIENT)
Age: 76
End: 2021-10-24

## 2021-10-28 ENCOUNTER — LAB (OUTPATIENT)
Dept: LAB | Facility: CLINIC | Age: 76
End: 2021-10-28
Payer: COMMERCIAL

## 2021-10-28 DIAGNOSIS — Z12.5 SCREENING FOR PROSTATE CANCER: ICD-10-CM

## 2021-10-28 DIAGNOSIS — R73.01 IFG (IMPAIRED FASTING GLUCOSE): ICD-10-CM

## 2021-10-28 DIAGNOSIS — E78.5 HYPERLIPIDEMIA LDL GOAL <130: ICD-10-CM

## 2021-10-28 DIAGNOSIS — Z00.00 MEDICARE ANNUAL WELLNESS VISIT, SUBSEQUENT: ICD-10-CM

## 2021-10-28 LAB
ANION GAP SERPL CALCULATED.3IONS-SCNC: 4 MMOL/L (ref 3–14)
BASOPHILS # BLD AUTO: 0 10E3/UL (ref 0–0.2)
BASOPHILS NFR BLD AUTO: 0 %
BUN SERPL-MCNC: 15 MG/DL (ref 7–30)
CALCIUM SERPL-MCNC: 9.2 MG/DL (ref 8.5–10.1)
CHLORIDE BLD-SCNC: 107 MMOL/L (ref 94–109)
CHOLEST SERPL-MCNC: 122 MG/DL
CO2 SERPL-SCNC: 27 MMOL/L (ref 20–32)
CREAT SERPL-MCNC: 1.16 MG/DL (ref 0.66–1.25)
EOSINOPHIL # BLD AUTO: 0.3 10E3/UL (ref 0–0.7)
EOSINOPHIL NFR BLD AUTO: 5 %
ERYTHROCYTE [DISTWIDTH] IN BLOOD BY AUTOMATED COUNT: 12.7 % (ref 10–15)
FASTING STATUS PATIENT QL REPORTED: YES
GFR SERPL CREATININE-BSD FRML MDRD: 61 ML/MIN/1.73M2
GLUCOSE BLD-MCNC: 114 MG/DL (ref 70–99)
HBA1C MFR BLD: 5.6 % (ref 0–5.6)
HCT VFR BLD AUTO: 45.3 % (ref 40–53)
HDLC SERPL-MCNC: 40 MG/DL
HGB BLD-MCNC: 15 G/DL (ref 13.3–17.7)
LDLC SERPL CALC-MCNC: 66 MG/DL
LYMPHOCYTES # BLD AUTO: 1.6 10E3/UL (ref 0.8–5.3)
LYMPHOCYTES NFR BLD AUTO: 22 %
MCH RBC QN AUTO: 32.1 PG (ref 26.5–33)
MCHC RBC AUTO-ENTMCNC: 33.1 G/DL (ref 31.5–36.5)
MCV RBC AUTO: 97 FL (ref 78–100)
MONOCYTES # BLD AUTO: 0.5 10E3/UL (ref 0–1.3)
MONOCYTES NFR BLD AUTO: 7 %
NEUTROPHILS # BLD AUTO: 4.7 10E3/UL (ref 1.6–8.3)
NEUTROPHILS NFR BLD AUTO: 66 %
NONHDLC SERPL-MCNC: 82 MG/DL
PLATELET # BLD AUTO: 171 10E3/UL (ref 150–450)
POTASSIUM BLD-SCNC: 4.3 MMOL/L (ref 3.4–5.3)
PSA SERPL-MCNC: 4.01 UG/L (ref 0–4)
RBC # BLD AUTO: 4.67 10E6/UL (ref 4.4–5.9)
SODIUM SERPL-SCNC: 138 MMOL/L (ref 133–144)
T3FREE SERPL-MCNC: 3 PG/ML (ref 2.3–4.2)
T4 FREE SERPL-MCNC: 0.84 NG/DL (ref 0.76–1.46)
TRIGL SERPL-MCNC: 81 MG/DL
TSH SERPL DL<=0.005 MIU/L-ACNC: 2.72 MU/L (ref 0.4–4)
WBC # BLD AUTO: 7.1 10E3/UL (ref 4–11)

## 2021-10-28 PROCEDURE — 83036 HEMOGLOBIN GLYCOSYLATED A1C: CPT

## 2021-10-28 PROCEDURE — 84481 FREE ASSAY (FT-3): CPT

## 2021-10-28 PROCEDURE — G0103 PSA SCREENING: HCPCS

## 2021-10-28 PROCEDURE — 84439 ASSAY OF FREE THYROXINE: CPT

## 2021-10-28 PROCEDURE — 80061 LIPID PANEL: CPT

## 2021-10-28 PROCEDURE — 85025 COMPLETE CBC W/AUTO DIFF WBC: CPT

## 2021-10-28 PROCEDURE — 84443 ASSAY THYROID STIM HORMONE: CPT

## 2021-10-28 PROCEDURE — 82043 UR ALBUMIN QUANTITATIVE: CPT

## 2021-10-28 PROCEDURE — 36415 COLL VENOUS BLD VENIPUNCTURE: CPT

## 2021-10-28 PROCEDURE — 80048 BASIC METABOLIC PNL TOTAL CA: CPT

## 2021-10-30 LAB
CREAT UR-MCNC: 209 MG/DL
MICROALBUMIN UR-MCNC: 115 MG/L
MICROALBUMIN/CREAT UR: 55.02 MG/G CR (ref 0–17)

## 2021-11-04 ENCOUNTER — OFFICE VISIT (OUTPATIENT)
Dept: OTHER | Facility: CLINIC | Age: 76
End: 2021-11-04
Attending: INTERNAL MEDICINE
Payer: COMMERCIAL

## 2021-11-04 VITALS
WEIGHT: 227 LBS | HEIGHT: 65 IN | DIASTOLIC BLOOD PRESSURE: 66 MMHG | BODY MASS INDEX: 37.82 KG/M2 | RESPIRATION RATE: 16 BRPM | HEART RATE: 91 BPM | SYSTOLIC BLOOD PRESSURE: 128 MMHG | OXYGEN SATURATION: 98 %

## 2021-11-04 DIAGNOSIS — E78.5 HYPERLIPIDEMIA LDL GOAL <130: ICD-10-CM

## 2021-11-04 DIAGNOSIS — Z12.5 SCREENING FOR PROSTATE CANCER: ICD-10-CM

## 2021-11-04 DIAGNOSIS — Z00.00 MEDICARE ANNUAL WELLNESS VISIT, SUBSEQUENT: Primary | ICD-10-CM

## 2021-11-04 DIAGNOSIS — N40.0 BENIGN NON-NODULAR PROSTATIC HYPERPLASIA WITHOUT LOWER URINARY TRACT SYMPTOMS: ICD-10-CM

## 2021-11-04 DIAGNOSIS — N28.9 RENAL INSUFFICIENCY: ICD-10-CM

## 2021-11-04 DIAGNOSIS — R73.01 IFG (IMPAIRED FASTING GLUCOSE): ICD-10-CM

## 2021-11-04 PROCEDURE — G0439 PPPS, SUBSEQ VISIT: HCPCS | Performed by: INTERNAL MEDICINE

## 2021-11-04 PROCEDURE — 99213 OFFICE O/P EST LOW 20 MIN: CPT | Mod: 25 | Performed by: INTERNAL MEDICINE

## 2021-11-04 PROCEDURE — G0463 HOSPITAL OUTPT CLINIC VISIT: HCPCS

## 2021-11-04 RX ORDER — SIMVASTATIN 20 MG
20 TABLET ORAL AT BEDTIME
Qty: 90 TABLET | Refills: 3 | Status: SHIPPED | OUTPATIENT
Start: 2021-11-04 | End: 2022-02-01

## 2021-11-04 RX ORDER — TERAZOSIN 10 MG/1
CAPSULE ORAL
Qty: 90 CAPSULE | Refills: 3 | Status: SHIPPED | OUTPATIENT
Start: 2021-11-04 | End: 2022-04-28

## 2021-11-04 ASSESSMENT — MIFFLIN-ST. JEOR: SCORE: 1686.55

## 2021-11-04 NOTE — PROGRESS NOTES
"[facility]        Patient is here for a follow up  to discuss about hyperlipemia       BP (!) 144/79 (BP Location: Right arm, Patient Position: Chair, Cuff Size: Adult Regular)   Pulse 91   Resp 16   Ht 5' 5\" (1.651 m)   Wt 227 lb (103 kg)   SpO2 98%   BMI 37.77 kg/m      The provider has been notified that the patient has no concerns.     Questions patient would like addressed today are: N/A.    Refills are needed: No    Has homecare services and agency name:  [facility]      "

## 2021-11-04 NOTE — PROGRESS NOTES
Candelario Ruth is a 76 year old male who presents for       Medicare annual wellness visit, subsequent  Renal insufficiency  IFG (impaired fasting glucose)  Hyperlipidemia LDL goal <130  Screening for prostate cancer  Benign non-nodular prostatic hyperplasia without lower urinary tract symptoms        HPI: Candelario Ruth is a 76 year old male with htn, HLD, IFG presenting for lab f/u as well as to address meds.    Review Of Systems  Skin: negative  Eyes: negative  Ears/Nose/Throat: negative  Respiratory: No shortness of breath, dyspnea on exertion, cough, or hemoptysis  Cardiovascular: negative  Gastrointestinal: negative  Genitourinary: negative  Musculoskeletal: negative  Neurologic: negative  Psychiatric: negative  Hematologic/Lymphatic/Immunologic: negative  Endocrine: negative      Current providers caring for this patient include:  Patient Care Team:  Trenton Thomas MD as PCP - General  Trenton Thomas MD as Assigned Heart and Vascular Provider    Complete Medical and Social history reviewed with patient, outlined below.    Patient Active Problem List   Diagnosis     Hypertension goal BP (blood pressure) < 130/80     Hyperlipidemia LDL goal <130     Advanced directives, counseling/discussion     S/P TURP (status post transurethral resection of prostate)       Past Medical History:   Diagnosis Date     BPH      Sleep apnea        Past Surgical History:   Procedure Laterality Date     CYSTOSCOPY, TRANSURETHRAL RESECTION (TUR) PROSTATE, COMBINED N/A 2016    Procedure: COMBINED CYSTOSCOPY, TRANSURETHRAL RESECTION (TUR) PROSTATE;  Surgeon: Pepe Srinivasan MD;  Location:  OR     [facility] NONSPECIFIC PROCEDURE  1993    lumbar surgery       Family History   Problem Relation Age of Onset     Heart Disease Mother         alive age (1924)     Diabetes Father          age 84     Cerebrovascular Disease Father      Cerebrovascular Disease Brother         alive age 57     Family  "History Negative Sister         alive age 60     Family History Negative Sister         alive age 55     Family History Negative Sister         alive age 52       Social History     Tobacco Use     Smoking status: Former Smoker     Quit date: 3/6/1973     Years since quittin.6     Smokeless tobacco: Never Used   Substance Use Topics     Alcohol use: Yes     Comment: occasional       Diet: regular, low salt/low fat  Physical Activity: active without specific exercise program  Depression Screen:    Over the past 2 weeks, patient has felt down, depressed, or hopeless:  No    Over the past 2 weeks, patient has felt little interest or pleasure in doing things: No    Functional ability/Safety screen:  Up and go test (able to get up and walk longer than 30 seconds): Passed  Patient needs assistance with: nothing  Patient's home has the following possible safety concerns: none identified  Patient has concerns about his hearing:  No  Cognitive Screen  Patient repeats three objects (ball, flag, tree)      Clock drawing test:   NORMAL  Recalls three objects after 3 minutes (ball,flag,tree):                                                                                               recalls 3 objects (3 points)    Physical Exam:  /66 (BP Location: Right arm, Patient Position: Chair, Cuff Size: Adult Regular)   Pulse 91   Resp 16   Ht 5' 5\" (1.651 m)   Wt 227 lb (103 kg)   SpO2 98%   BMI 37.77 kg/m     Body mass index is 37.77 kg/m .                  GENERAL APPEARANCE: healthy, alert and no distress  PSYCH: Affect normal/bright.  Mentation within normal limits.  EYES: conjunctiva clear  HENT: ear canals and TM's normal.  Nose and mouth without ulcers, erythema or lesions  NECK: supple, nontender, no lymphadenopathy  RESP: lungs clear to auscultation - no rales, rhonchi or wheezes  CV: regular rates and rhythm, normal S1 S2, no murmur noted  ABDOMEN:  soft, nontender, no HSM or masses and bowel sounds " normal  SKIN: no suspicious lesions or rashes  NEURO: Normal strength and tone,  normal speech and mentation  Extremities: no peripheral edema or tenderness, peripheral pulses normal  MS: extremities normal- no gross deformities noted, no erythema, FROM noted in all extremities  PSYCH: mentation appears normal  LYMPHATICS: no cervical adenopathy      Component      Latest Ref Rng & Units 5/5/2020 9/15/2020 10/23/2020   WBC      4.0 - 11.0 10e3/uL 7.4     RBC Count      4.40 - 5.90 10e6/uL 4.76     Hemoglobin      13.3 - 17.7 g/dL 15.2     Hematocrit      40.0 - 53.0 % 45.1     MCV      78 - 100 fL 95     MCH      26.5 - 33.0 pg 31.9     MCHC      31.5 - 36.5 g/dL 33.7     RDW      10.0 - 15.0 % 12.6     Platelet Count      150 - 450 10e3/uL 177     % Neutrophils      % 65.6     % Lymphocytes      % 22.0     % Monocytes      % 7.8     % Eosinophils      % 4.3     % Basophils      % 0.3     Absolute Neutrophil      1.6 - 8.3 10e9/L 4.9     Absolute Lymphocytes      0.8 - 5.3 10e9/L 1.6     Absolute Monocytes      0.0 - 1.3 10e9/L 0.6     Absolute Eosinophils      0.0 - 0.7 10e9/L 0.3     Absolute Basophils      0.0 - 0.2 10e9/L 0.0     Diff Method       Automated Method     Absolute Neutrophils      1.6 - 8.3 10e3/uL      Absolute Lymphocytes      0.8 - 5.3 10e3/uL      Absolute Monocytes      0.0 - 1.3 10e3/uL      Absolute Eosinophils      0.0 - 0.7 10e3/uL      Absolute Basophils      0.0 - 0.2 10e3/uL      Sodium      133 - 144 mmol/L 140 138 141   Potassium      3.4 - 5.3 mmol/L 3.9 3.9 4.0   Chloride      94 - 109 mmol/L 106 106 107   Carbon Dioxide      20 - 32 mmol/L 27 28 28   Anion Gap      3 - 14 mmol/L 7 4 6   Glucose      70 - 99 mg/dL 110 (H) 100 (H) 98   Urea Nitrogen      7 - 30 mg/dL 13 14 16   Creatinine      0.66 - 1.25 mg/dL 1.06 1.33 (H) 1.17   GFR Estimate      >60 mL/min/1.73m2 68 52 (L) 60 (L)   GFR Estimate If Black      >60 mL/min/1.73:m2 79 60 (L) 70   Calcium      8.5 - 10.1 mg/dL 8.7 9.1  9.1   Bilirubin Direct      0.0 - 0.2 mg/dL 0.1     Bilirubin Total      0.2 - 1.3 mg/dL 0.4     Albumin      3.4 - 5.0 g/dL 3.6     Protein Total      6.8 - 8.8 g/dL 7.2     Alkaline Phosphatase      40 - 150 U/L 52     ALT      0 - 70 U/L 28     AST      0 - 45 U/L 17     Cholesterol      <200 mg/dL 125     Triglycerides      <150 mg/dL 94     HDL Cholesterol      >=40 mg/dL 39 (L)     LDL Cholesterol Calculated      <=100 mg/dL 67     Non HDL Cholesterol      <130 mg/dL 86     Patient Fasting > 8hrs?            Creatinine Urine      mg/dL 167  230   Albumin Urine mg/L      mg/L 18     Albumin Urine mg/g Cr      0.00 - 17.00 mg/g Cr 10.66     Sodium Urine mmol/L      mmol/L   146   %FENA      %   0.5   PSA      0.00 - 4.00 ug/L 3.98     Hemoglobin A1C      0.0 - 5.6 % 5.5 5.8 (H)    TSH      0.40 - 4.00 mU/L 2.86     T4 Free      0.76 - 1.46 ng/dL 0.86     Free T3      2.3 - 4.2 pg/mL 2.8       Component      Latest Ref Rng & Units 10/28/2021   WBC      4.0 - 11.0 10e3/uL 7.1   RBC Count      4.40 - 5.90 10e6/uL 4.67   Hemoglobin      13.3 - 17.7 g/dL 15.0   Hematocrit      40.0 - 53.0 % 45.3   MCV      78 - 100 fL 97   MCH      26.5 - 33.0 pg 32.1   MCHC      31.5 - 36.5 g/dL 33.1   RDW      10.0 - 15.0 % 12.7   Platelet Count      150 - 450 10e3/uL 171   % Neutrophils      % 66   % Lymphocytes      % 22   % Monocytes      % 7   % Eosinophils      % 5   % Basophils      % 0   Absolute Neutrophil      1.6 - 8.3 10e9/L    Absolute Lymphocytes      0.8 - 5.3 10e9/L    Absolute Monocytes      0.0 - 1.3 10e9/L    Absolute Eosinophils      0.0 - 0.7 10e9/L    Absolute Basophils      0.0 - 0.2 10e9/L    Diff Method          Absolute Neutrophils      1.6 - 8.3 10e3/uL 4.7   Absolute Lymphocytes      0.8 - 5.3 10e3/uL 1.6   Absolute Monocytes      0.0 - 1.3 10e3/uL 0.5   Absolute Eosinophils      0.0 - 0.7 10e3/uL 0.3   Absolute Basophils      0.0 - 0.2 10e3/uL 0.0   Sodium      133 - 144 mmol/L 138   Potassium      3.4 -  5.3 mmol/L 4.3   Chloride      94 - 109 mmol/L 107   Carbon Dioxide      20 - 32 mmol/L 27   Anion Gap      3 - 14 mmol/L 4   Glucose      70 - 99 mg/dL 114 (H)   Urea Nitrogen      7 - 30 mg/dL 15   Creatinine      0.66 - 1.25 mg/dL 1.16   GFR Estimate      >60 mL/min/1.73m2 61   GFR Estimate If Black      >60 mL/min/1.73:m2    Calcium      8.5 - 10.1 mg/dL 9.2   Bilirubin Direct      0.0 - 0.2 mg/dL    Bilirubin Total      0.2 - 1.3 mg/dL    Albumin      3.4 - 5.0 g/dL    Protein Total      6.8 - 8.8 g/dL    Alkaline Phosphatase      40 - 150 U/L    ALT      0 - 70 U/L    AST      0 - 45 U/L    Cholesterol      <200 mg/dL 122   Triglycerides      <150 mg/dL 81   HDL Cholesterol      >=40 mg/dL 40   LDL Cholesterol Calculated      <=100 mg/dL 66   Non HDL Cholesterol      <130 mg/dL 82   Patient Fasting > 8hrs?       Yes   Creatinine Urine      mg/dL 209   Albumin Urine mg/L      mg/L 115   Albumin Urine mg/g Cr      0.00 - 17.00 mg/g Cr 55.02 (H)   Sodium Urine mmol/L      mmol/L    %FENA      %    PSA      0.00 - 4.00 ug/L 4.01 (H)   Hemoglobin A1C      0.0 - 5.6 % 5.6   TSH      0.40 - 4.00 mU/L 2.72   T4 Free      0.76 - 1.46 ng/dL 0.84   Free T3      2.3 - 4.2 pg/mL 3.0       End of Life Planning:   Patient currently has an advanced directive: Yes.  Practitioner is supportive of decision.    Education/Counseling:   Based on review of the above information, the following items were addressed:      Elevated blood pressure - follow-up plans made    Appropriate preventive services were discussed with this patient, including applicable screening as appropriate for cardiovascular disease, diabetes, osteopenia/osteoporosis, and glaucoma.  As appropriate for age/gender, discussed screening for colorectal cancer, prostate cancer, breast cancer, and cervical cancer.   Checklist reviewing preventive services available has been given to the patient.    A/P:      (Z00.00) Medicare annual wellness visit, subsequent   (primary encounter diagnosis)  Comment: doing well  Plan: CBC with platelets differential, Comprehensive         metabolic panel, TSH, T4 free, T3 Free, Lipid         Profile        RTC one year for annual physical    (N28.9) Renal insufficiency, resolved  Comment: monitor  Plan: Comprehensive metabolic panel            (R73.01) IFG (impaired fasting glucose)  Comment: RTC six months for labs, see me one week later  Plan: Lipid Profile, Hemoglobin A1c            (E78.5) Hyperlipidemia LDL goal <130  Comment: at goal, RTC six months for labs, see me one week later  Plan: simvastatin (ZOCOR) 20 MG tablet, TSH, T4 free,        T3 Free, Lipid Profile            (Z12.5) Screening for prostate cancer  Comment: normal for age  Plan: Prostate Specific Antigen Screen            (N40.0) Benign non-nodular prostatic hyperplasia without lower urinary tract symptoms, S/P TURP  Comment: needs the below  Plan: terazosin (HYTRIN) 10 MG capsule           20 minutes total not spent on Medicare annual physical spent providing medical care on evaluation and management of preexisting items #2 onward as delineated below.

## 2022-02-01 ENCOUNTER — TELEPHONE (OUTPATIENT)
Dept: OTHER | Facility: CLINIC | Age: 77
End: 2022-02-01
Payer: COMMERCIAL

## 2022-02-01 DIAGNOSIS — E78.5 HYPERLIPIDEMIA LDL GOAL <130: ICD-10-CM

## 2022-02-01 RX ORDER — SIMVASTATIN 20 MG
20 TABLET ORAL AT BEDTIME
Qty: 90 TABLET | Refills: 2 | Status: SHIPPED | OUTPATIENT
Start: 2022-02-01 | End: 2022-04-28

## 2022-02-01 NOTE — TELEPHONE ENCOUNTER
simvastatin (ZOCOR) 20 MG tablet  Last Written Prescription Date:  11/4/21  Last Fill Quantity: 90,  # refills: 3     Resent remaining Rx.  Vero Ruiz RN BSN  [facility]  [phone]

## 2022-02-01 NOTE — TELEPHONE ENCOUNTER
[facility]    Who is the name of the provider?:  Dr. Thomas      What is the location you see this provider at?: [place]    Can we leave a detailed message on this number? n/a    Reason for call:  Pt needing a refill for Rx Simvastatin. Pt states that he has one tablet left.     Pharmacy confirmation: [facility] in [place].      Ann Cannon    [facility]   [phone]

## 2022-04-21 ENCOUNTER — LAB (OUTPATIENT)
Dept: LAB | Facility: CLINIC | Age: 77
End: 2022-04-21
Payer: COMMERCIAL

## 2022-04-21 DIAGNOSIS — E78.5 HYPERLIPIDEMIA LDL GOAL <130: ICD-10-CM

## 2022-04-21 DIAGNOSIS — Z00.00 MEDICARE ANNUAL WELLNESS VISIT, SUBSEQUENT: ICD-10-CM

## 2022-04-21 DIAGNOSIS — R73.01 IFG (IMPAIRED FASTING GLUCOSE): ICD-10-CM

## 2022-04-21 DIAGNOSIS — N28.9 RENAL INSUFFICIENCY: ICD-10-CM

## 2022-04-21 DIAGNOSIS — Z12.5 SCREENING FOR PROSTATE CANCER: ICD-10-CM

## 2022-04-21 LAB
ALBUMIN SERPL-MCNC: 3.7 G/DL (ref 3.4–5)
ALP SERPL-CCNC: 51 U/L (ref 40–150)
ALT SERPL W P-5'-P-CCNC: 29 U/L (ref 0–70)
ANION GAP SERPL CALCULATED.3IONS-SCNC: 4 MMOL/L (ref 3–14)
AST SERPL W P-5'-P-CCNC: 16 U/L (ref 0–45)
BASOPHILS # BLD AUTO: 0 10E3/UL (ref 0–0.2)
BASOPHILS NFR BLD AUTO: 0 %
BILIRUB SERPL-MCNC: 0.6 MG/DL (ref 0.2–1.3)
BUN SERPL-MCNC: 14 MG/DL (ref 7–30)
CALCIUM SERPL-MCNC: 8.8 MG/DL (ref 8.5–10.1)
CHLORIDE BLD-SCNC: 106 MMOL/L (ref 94–109)
CHOLEST SERPL-MCNC: 117 MG/DL
CO2 SERPL-SCNC: 28 MMOL/L (ref 20–32)
CREAT SERPL-MCNC: 1.16 MG/DL (ref 0.66–1.25)
EOSINOPHIL # BLD AUTO: 0.3 10E3/UL (ref 0–0.7)
EOSINOPHIL NFR BLD AUTO: 4 %
ERYTHROCYTE [DISTWIDTH] IN BLOOD BY AUTOMATED COUNT: 12.7 % (ref 10–15)
FASTING STATUS PATIENT QL REPORTED: YES
GFR SERPL CREATININE-BSD FRML MDRD: 65 ML/MIN/1.73M2
GLUCOSE BLD-MCNC: 102 MG/DL (ref 70–99)
HBA1C MFR BLD: 5.8 % (ref 0–5.6)
HCT VFR BLD AUTO: 46 % (ref 40–53)
HDLC SERPL-MCNC: 38 MG/DL
HGB BLD-MCNC: 15.2 G/DL (ref 13.3–17.7)
LDLC SERPL CALC-MCNC: 57 MG/DL
LYMPHOCYTES # BLD AUTO: 1.6 10E3/UL (ref 0.8–5.3)
LYMPHOCYTES NFR BLD AUTO: 24 %
MCH RBC QN AUTO: 31.9 PG (ref 26.5–33)
MCHC RBC AUTO-ENTMCNC: 33 G/DL (ref 31.5–36.5)
MCV RBC AUTO: 97 FL (ref 78–100)
MONOCYTES # BLD AUTO: 0.5 10E3/UL (ref 0–1.3)
MONOCYTES NFR BLD AUTO: 7 %
NEUTROPHILS # BLD AUTO: 4.5 10E3/UL (ref 1.6–8.3)
NEUTROPHILS NFR BLD AUTO: 66 %
NONHDLC SERPL-MCNC: 79 MG/DL
PLATELET # BLD AUTO: 168 10E3/UL (ref 150–450)
POTASSIUM BLD-SCNC: 3.8 MMOL/L (ref 3.4–5.3)
PROT SERPL-MCNC: 7.1 G/DL (ref 6.8–8.8)
PSA SERPL-MCNC: 4.1 UG/L (ref 0–4)
RBC # BLD AUTO: 4.76 10E6/UL (ref 4.4–5.9)
SODIUM SERPL-SCNC: 138 MMOL/L (ref 133–144)
T3FREE SERPL-MCNC: 2.6 PG/ML (ref 2.3–4.2)
T4 FREE SERPL-MCNC: 0.85 NG/DL (ref 0.76–1.46)
TRIGL SERPL-MCNC: 112 MG/DL
TSH SERPL DL<=0.005 MIU/L-ACNC: 2.3 MU/L (ref 0.4–4)
WBC # BLD AUTO: 6.9 10E3/UL (ref 4–11)

## 2022-04-21 PROCEDURE — 36415 COLL VENOUS BLD VENIPUNCTURE: CPT

## 2022-04-21 PROCEDURE — 84443 ASSAY THYROID STIM HORMONE: CPT

## 2022-04-21 PROCEDURE — 80061 LIPID PANEL: CPT

## 2022-04-21 PROCEDURE — 80053 COMPREHEN METABOLIC PANEL: CPT

## 2022-04-21 PROCEDURE — 84439 ASSAY OF FREE THYROXINE: CPT

## 2022-04-21 PROCEDURE — G0103 PSA SCREENING: HCPCS

## 2022-04-21 PROCEDURE — 84481 FREE ASSAY (FT-3): CPT

## 2022-04-21 PROCEDURE — 83036 HEMOGLOBIN GLYCOSYLATED A1C: CPT

## 2022-04-21 PROCEDURE — 85025 COMPLETE CBC W/AUTO DIFF WBC: CPT

## 2022-04-28 ENCOUNTER — OFFICE VISIT (OUTPATIENT)
Dept: OTHER | Facility: CLINIC | Age: 77
End: 2022-04-28
Attending: INTERNAL MEDICINE
Payer: COMMERCIAL

## 2022-04-28 VITALS — SYSTOLIC BLOOD PRESSURE: 128 MMHG | OXYGEN SATURATION: 97 % | DIASTOLIC BLOOD PRESSURE: 76 MMHG | HEART RATE: 73 BPM

## 2022-04-28 DIAGNOSIS — E78.5 HYPERLIPIDEMIA LDL GOAL <130: ICD-10-CM

## 2022-04-28 DIAGNOSIS — N40.0 BENIGN NON-NODULAR PROSTATIC HYPERPLASIA WITHOUT LOWER URINARY TRACT SYMPTOMS: ICD-10-CM

## 2022-04-28 DIAGNOSIS — N28.9 RENAL INSUFFICIENCY: ICD-10-CM

## 2022-04-28 DIAGNOSIS — R73.01 IFG (IMPAIRED FASTING GLUCOSE): ICD-10-CM

## 2022-04-28 PROCEDURE — G0463 HOSPITAL OUTPT CLINIC VISIT: HCPCS

## 2022-04-28 PROCEDURE — 99214 OFFICE O/P EST MOD 30 MIN: CPT | Performed by: INTERNAL MEDICINE

## 2022-04-28 RX ORDER — SIMVASTATIN 20 MG
20 TABLET ORAL AT BEDTIME
Qty: 90 TABLET | Refills: 3 | Status: SHIPPED | OUTPATIENT
Start: 2022-04-28 | End: 2022-10-31

## 2022-04-28 RX ORDER — TERAZOSIN 10 MG/1
CAPSULE ORAL
Qty: 90 CAPSULE | Refills: 3 | Status: SHIPPED | OUTPATIENT
Start: 2022-04-28 | End: 2022-10-31

## 2022-04-28 NOTE — PROGRESS NOTES
Patient is here to discuss follow up.    /76 (BP Location: Right arm, Patient Position: Chair, Cuff Size: Adult Large)   Pulse 73   SpO2 97%     Questions patient would like addressed today are: N/A.    Refills are needed: No    Has homecare services and agency name:  [facility]

## 2022-04-28 NOTE — PROGRESS NOTES
Candelario Ruth is a 77 year old male who is presenting at the current time to discuss his diagnosi(es) of        Renal insufficiency  IFG (impaired fasting glucose)  Hyperlipidemia LDL goal <130  Screening for prostate cancer  Benign non-nodular prostatic hyperplasia without lower urinary tract symptoms      HPI: Candelario Ruth is a 76 year old male with htn, HLD, IFG presenting for lab f/u as well as to address meds..          Review Of Systems  Skin: negative  Eyes: negative  Ears/Nose/Throat: negative  Respiratory: No shortness of breath, dyspnea on exertion, cough, or hemoptysis  Cardiovascular: negative  Gastrointestinal: negative  Genitourinary: negative  Musculoskeletal: negative  Neurologic: negative  Psychiatric: negative  Hematologic/Lymphatic/Immunologic: negative  Endocrine: negative      PAST MEDICAL HISTORY:                  Past Medical History:   Diagnosis Date     BPH      Sleep apnea        PAST SURGICAL HISTORY:                  Past Surgical History:   Procedure Laterality Date     CYSTOSCOPY, TRANSURETHRAL RESECTION (TUR) PROSTATE, COMBINED N/A 4/12/2016    Procedure: COMBINED CYSTOSCOPY, TRANSURETHRAL RESECTION (TUR) PROSTATE;  Surgeon: Pepe Srinivasan MD;  Location:  OR     [facility] NONSPECIFIC PROCEDURE  1993    lumbar surgery       CURRENT MEDICATIONS:                  Current Outpatient Medications   Medication Sig Dispense Refill     simvastatin (ZOCOR) 20 MG tablet Take 1 tablet (20 mg) by mouth At Bedtime 90 tablet 2     terazosin (HYTRIN) 10 MG capsule TAKE 1 CAPSULE BY MOUTH AT BEDTIME 90 capsule 3       ALLERGIES:                  Allergies   Allergen Reactions     Amoxicillin      Turns him purple       SOCIAL HISTORY:                  Social History     Socioeconomic History     Marital status:      Spouse name: Andra     Number of children: 3     Years of education: 14     Highest education level: Not on file   Occupational History     Occupation: [email or web address]      Employer: [employer]   Tobacco Use     Smoking status: Former Smoker     Quit date: 3/6/1973     Years since quittin.1     Smokeless tobacco: Never Used   Substance and Sexual Activity     Alcohol use: Not Currently     Comment: occasional     Drug use: No     Sexual activity: Not on file   Other Topics Concern     Parent/sibling w/ CABG, MI or angioplasty before 65F 55M? Not Asked   Social History Narrative     Not on file     Social Determinants of Health     Financial Resource Strain: Not on file   Food Insecurity: Not on file   Transportation Needs: Not on file   Physical Activity: Not on file   Stress: Not on file   Social Connections: Not on file   Intimate Partner Violence: Not on file   Housing Stability: Not on file       FAMILY HISTORY:                   Family History   Problem Relation Age of Onset     Heart Disease Mother         alive age (1924)     Diabetes Father          age 84     Cerebrovascular Disease Father      Cerebrovascular Disease Brother         alive age 57     Family History Negative Sister         alive age 60     Family History Negative Sister         alive age 55     Family History Negative Sister         alive age 52         Physical exam Reveals:    O/P: WNL  HEENT: WNL  NECK: No JVD, thyromegaly, or lymphadenopathy  HEART: RRR, no murmurs, gallops, or rubs  LUNGS: CTA bilaterally without rales, wheezes, or rhonchi  GI: NABS, nondistended, nontender, soft  EXT:without cyanosis, clubbing, or edema  NEURO: nonfocal  : no flank tenderness      Component      Latest Ref Rng & Units 2022   WBC      4.0 - 11.0 10e3/uL 6.9   RBC Count      4.40 - 5.90 10e6/uL 4.76   Hemoglobin      13.3 - 17.7 g/dL 15.2   Hematocrit      40.0 - 53.0 % 46.0   MCV      78 - 100 fL 97   MCH      26.5 - 33.0 pg 31.9   MCHC      31.5 - 36.5 g/dL 33.0   RDW      10.0 - 15.0 % 12.7   Platelet Count      150 - 450 10e3/uL 168   % Neutrophils      % 66   % Lymphocytes      % 24   %  Monocytes      % 7   % Eosinophils      % 4   % Basophils      % 0   Absolute Neutrophils      1.6 - 8.3 10e3/uL 4.5   Absolute Lymphocytes      0.8 - 5.3 10e3/uL 1.6   Absolute Monocytes      0.0 - 1.3 10e3/uL 0.5   Absolute Eosinophils      0.0 - 0.7 10e3/uL 0.3   Absolute Basophils      0.0 - 0.2 10e3/uL 0.0   Sodium      133 - 144 mmol/L 138   Potassium      3.4 - 5.3 mmol/L 3.8   Chloride      94 - 109 mmol/L 106   Carbon Dioxide      20 - 32 mmol/L 28   Anion Gap      3 - 14 mmol/L 4   Urea Nitrogen      7 - 30 mg/dL 14   Creatinine      0.66 - 1.25 mg/dL 1.16   Calcium      8.5 - 10.1 mg/dL 8.8   Glucose      70 - 99 mg/dL 102 (H)   Alkaline Phosphatase      40 - 150 U/L 51   AST      0 - 45 U/L 16   ALT      0 - 70 U/L 29   Protein Total      6.8 - 8.8 g/dL 7.1   Albumin      3.4 - 5.0 g/dL 3.7   Bilirubin Total      0.2 - 1.3 mg/dL 0.6   GFR Estimate      >60 mL/min/1.73m2 65   Cholesterol      <200 mg/dL 117   Triglycerides      <150 mg/dL 112   HDL Cholesterol      >=40 mg/dL 38 (L)   LDL Cholesterol Calculated      <=100 mg/dL 57   Non HDL Cholesterol      <130 mg/dL 79   Patient Fasting > 8hrs?       Yes   TSH      0.40 - 4.00 mU/L 2.30   T4 Free      0.76 - 1.46 ng/dL 0.85   Free T3      2.3 - 4.2 pg/mL 2.6   Hemoglobin A1C      0.0 - 5.6 % 5.8 (H)   PSA      0.00 - 4.00 ug/L 4.10 (H)     Component      Latest Ref Rng & Units 3/5/2003 11/5/2003 6/18/2004 11/30/2004   PSA Free             Prostate Specific Antigen             PSAPCT             PSA      0.00 - 4.00 ug/L 4.8 (H) 4.4 (H) 3.98 3.52     Component      Latest Ref Rng & Units 6/10/2005 1/3/2006 6/22/2006 6/18/2007   PSA Free        1.2 1.1    Prostate Specific Antigen        5.4 (H) 4.3 (H)    PSAPCT        22 26    PSA      0.00 - 4.00 ug/L 4.57 (H)   3.72     Component      Latest Ref Rng & Units 6/18/2008 3/8/2010 9/7/2010 2/20/2012   PSA Free       1.5 1.2     Prostate Specific Antigen       6.4 (H) 4.2 (H)     PSAPCT       23 29      PSA      0.00 - 4.00 ug/L   3.92 4.16 (H)     Component      Latest Ref Rng & Units 3/8/2013 3/21/2014 3/6/2015 3/10/2017   PSA Free             Prostate Specific Antigen             PSAPCT             PSA      0.00 - 4.00 ug/L 4.36 (H) 4.43 (H) 5.20 (H) 3.03     Component      Latest Ref Rng & Units 3/12/2018 2/25/2019 5/5/2020 10/28/2021   PSA Free             Prostate Specific Antigen             PSAPCT             PSA      0.00 - 4.00 ug/L 3.16 3.55 3.98 4.01 (H)     Component      Latest Ref Rng & Units 4/21/2022   PSA Free          Prostate Specific Antigen          PSAPCT          PSA      0.00 - 4.00 ug/L 4.10 (H)       A/P:    (N28.9) Renal insufficiency, resolved  Comment: Cr normal  Plan: CBC with platelets and differential,         Comprehensive metabolic panel (BMP + Alb, Alk         Phos, ALT, AST, Total. Bili, TP)           (R73.01) IFG (impaired fasting glucose)  Comment: avoid CHO  Plan: Comprehensive metabolic panel (BMP + Alb, Alk         Phos, ALT, AST, Total. Bili, TP), Hemoglobin         A1c (aka HBA1C)           (E78.5) Hyperlipidemia LDL goal <130  Comment: at goal  Plan: simvastatin (ZOCOR) 20 MG tablet, TSH, T4,         free, T3 Free, Lipid panel reflex to direct LDL        Fasting            (N40.0) Benign non-nodular prostatic hyperplasia without lower urinary tract symptoms, S/P TURP, w/ mild PSA elevation  Comment: PSA velocity is flat  Plan: further prostate cancer surveillance not necessary based upon his age unless his  MD feels this to be appropriate.      30 minutes medical care on today's date.

## 2022-10-15 ENCOUNTER — HEALTH MAINTENANCE LETTER (OUTPATIENT)
Age: 77
End: 2022-10-15

## 2022-10-24 ENCOUNTER — LAB (OUTPATIENT)
Dept: LAB | Facility: CLINIC | Age: 77
End: 2022-10-24
Payer: COMMERCIAL

## 2022-10-24 DIAGNOSIS — R73.01 IFG (IMPAIRED FASTING GLUCOSE): ICD-10-CM

## 2022-10-24 DIAGNOSIS — E78.5 HYPERLIPIDEMIA LDL GOAL <130: ICD-10-CM

## 2022-10-24 DIAGNOSIS — N28.9 RENAL INSUFFICIENCY: ICD-10-CM

## 2022-10-24 LAB
ALBUMIN SERPL-MCNC: 3.5 G/DL (ref 3.4–5)
ALP SERPL-CCNC: 50 U/L (ref 40–150)
ALT SERPL W P-5'-P-CCNC: 28 U/L (ref 0–70)
ANION GAP SERPL CALCULATED.3IONS-SCNC: 3 MMOL/L (ref 3–14)
AST SERPL W P-5'-P-CCNC: 16 U/L (ref 0–45)
BASOPHILS # BLD AUTO: 0 10E3/UL (ref 0–0.2)
BASOPHILS NFR BLD AUTO: 0 %
BILIRUB SERPL-MCNC: 0.5 MG/DL (ref 0.2–1.3)
BUN SERPL-MCNC: 13 MG/DL (ref 7–30)
CALCIUM SERPL-MCNC: 9.1 MG/DL (ref 8.5–10.1)
CHLORIDE BLD-SCNC: 107 MMOL/L (ref 94–109)
CHOLEST SERPL-MCNC: 120 MG/DL
CO2 SERPL-SCNC: 30 MMOL/L (ref 20–32)
CREAT SERPL-MCNC: 1.2 MG/DL (ref 0.66–1.25)
EOSINOPHIL # BLD AUTO: 0.4 10E3/UL (ref 0–0.7)
EOSINOPHIL NFR BLD AUTO: 5 %
ERYTHROCYTE [DISTWIDTH] IN BLOOD BY AUTOMATED COUNT: 12.9 % (ref 10–15)
FASTING STATUS PATIENT QL REPORTED: YES
GFR SERPL CREATININE-BSD FRML MDRD: 62 ML/MIN/1.73M2
GLUCOSE BLD-MCNC: 122 MG/DL (ref 70–99)
HBA1C MFR BLD: 5.8 % (ref 0–5.6)
HCT VFR BLD AUTO: 46.3 % (ref 40–53)
HDLC SERPL-MCNC: 40 MG/DL
HGB BLD-MCNC: 15.2 G/DL (ref 13.3–17.7)
LDLC SERPL CALC-MCNC: 61 MG/DL
LYMPHOCYTES # BLD AUTO: 1.7 10E3/UL (ref 0.8–5.3)
LYMPHOCYTES NFR BLD AUTO: 21 %
MCH RBC QN AUTO: 31.5 PG (ref 26.5–33)
MCHC RBC AUTO-ENTMCNC: 32.8 G/DL (ref 31.5–36.5)
MCV RBC AUTO: 96 FL (ref 78–100)
MONOCYTES # BLD AUTO: 0.6 10E3/UL (ref 0–1.3)
MONOCYTES NFR BLD AUTO: 8 %
NEUTROPHILS # BLD AUTO: 5.2 10E3/UL (ref 1.6–8.3)
NEUTROPHILS NFR BLD AUTO: 65 %
NONHDLC SERPL-MCNC: 80 MG/DL
PLATELET # BLD AUTO: 189 10E3/UL (ref 150–450)
POTASSIUM BLD-SCNC: 4 MMOL/L (ref 3.4–5.3)
PROT SERPL-MCNC: 7.1 G/DL (ref 6.8–8.8)
RBC # BLD AUTO: 4.83 10E6/UL (ref 4.4–5.9)
SODIUM SERPL-SCNC: 140 MMOL/L (ref 133–144)
T3FREE SERPL-MCNC: 3.2 PG/ML (ref 2–4.4)
T4 FREE SERPL-MCNC: 0.79 NG/DL (ref 0.76–1.46)
TRIGL SERPL-MCNC: 94 MG/DL
TSH SERPL DL<=0.005 MIU/L-ACNC: 1.95 MU/L (ref 0.4–4)
WBC # BLD AUTO: 7.9 10E3/UL (ref 4–11)

## 2022-10-24 PROCEDURE — 84439 ASSAY OF FREE THYROXINE: CPT

## 2022-10-24 PROCEDURE — 84443 ASSAY THYROID STIM HORMONE: CPT

## 2022-10-24 PROCEDURE — 80053 COMPREHEN METABOLIC PANEL: CPT

## 2022-10-24 PROCEDURE — 85025 COMPLETE CBC W/AUTO DIFF WBC: CPT

## 2022-10-24 PROCEDURE — 84481 FREE ASSAY (FT-3): CPT

## 2022-10-24 PROCEDURE — 36415 COLL VENOUS BLD VENIPUNCTURE: CPT

## 2022-10-24 PROCEDURE — 83036 HEMOGLOBIN GLYCOSYLATED A1C: CPT

## 2022-10-24 PROCEDURE — 80061 LIPID PANEL: CPT

## 2022-10-31 ENCOUNTER — OFFICE VISIT (OUTPATIENT)
Dept: OTHER | Facility: CLINIC | Age: 77
End: 2022-10-31
Attending: INTERNAL MEDICINE
Payer: COMMERCIAL

## 2022-10-31 VITALS
HEIGHT: 68 IN | WEIGHT: 232 LBS | DIASTOLIC BLOOD PRESSURE: 62 MMHG | OXYGEN SATURATION: 97 % | SYSTOLIC BLOOD PRESSURE: 108 MMHG | BODY MASS INDEX: 35.16 KG/M2 | HEART RATE: 63 BPM

## 2022-10-31 DIAGNOSIS — R73.01 IFG (IMPAIRED FASTING GLUCOSE): ICD-10-CM

## 2022-10-31 DIAGNOSIS — Z12.5 SCREENING FOR PROSTATE CANCER: Primary | ICD-10-CM

## 2022-10-31 DIAGNOSIS — N40.0 BENIGN NON-NODULAR PROSTATIC HYPERPLASIA WITHOUT LOWER URINARY TRACT SYMPTOMS: ICD-10-CM

## 2022-10-31 DIAGNOSIS — N28.9 RENAL INSUFFICIENCY: ICD-10-CM

## 2022-10-31 DIAGNOSIS — E78.5 HYPERLIPIDEMIA LDL GOAL <130: ICD-10-CM

## 2022-10-31 DIAGNOSIS — Z00.00 MEDICARE ANNUAL WELLNESS VISIT, SUBSEQUENT: ICD-10-CM

## 2022-10-31 PROCEDURE — G0439 PPPS, SUBSEQ VISIT: HCPCS | Performed by: INTERNAL MEDICINE

## 2022-10-31 PROCEDURE — 99213 OFFICE O/P EST LOW 20 MIN: CPT | Mod: 25 | Performed by: INTERNAL MEDICINE

## 2022-10-31 PROCEDURE — G0463 HOSPITAL OUTPT CLINIC VISIT: HCPCS

## 2022-10-31 RX ORDER — TERAZOSIN 10 MG/1
CAPSULE ORAL
Qty: 90 CAPSULE | Refills: 3 | Status: SHIPPED | OUTPATIENT
Start: 2022-10-31 | End: 2023-11-16

## 2022-10-31 RX ORDER — SIMVASTATIN 20 MG
20 TABLET ORAL AT BEDTIME
Qty: 90 TABLET | Refills: 3 | Status: SHIPPED | OUTPATIENT
Start: 2022-10-31 | End: 2023-10-23

## 2022-10-31 NOTE — PROGRESS NOTES
"Patient is here to discuss follow up.    BP (!) 149/65 (BP Location: Right arm, Patient Position: Chair, Cuff Size: Adult Regular)   Pulse 63   Ht 5' 7.5\" (1.715 m)   Wt 232 lb (105.2 kg)   SpO2 97%   BMI 35.80 kg/m      Questions patient would like addressed today are: N/A.    Refills are needed: Yes:  Simvastatin    Has homecare services and agency name:  Judy Roberts  "

## 2022-10-31 NOTE — PROGRESS NOTES
Candelario Ruth is a 77 year old male who presents for        Medicare annual wellness visit, subsequent  Renal insufficiency  IFG (impaired fasting glucose)  Hyperlipidemia LDL goal <130  Screening for prostate cancer  Benign non-nodular prostatic hyperplasia without lower urinary tract symptoms           HPI: Candelario Ruth is a 77 year old male with htn, HLD, IFG presenting for lab f/u as well as to address meds.     Review Of Systems  Skin: negative  Eyes: negative  Ears/Nose/Throat: negative  Respiratory: No shortness of breath, dyspnea on exertion, cough, or hemoptysis  Cardiovascular: negative  Gastrointestinal: negative  Genitourinary: negative  Musculoskeletal: negative  Neurologic: negative  Psychiatric: negative  Hematologic/Lymphatic/Immunologic: negative  Endocrine: negative        Current providers caring for this patient include:  Patient Care Team:  Trenton Thomas MD as PCP - General  Trenton Thomas MD as Assigned Heart and Vascular Provider     Complete Medical and Social history reviewed with patient, outlined below.         Patient Active Problem List   Diagnosis     Hypertension goal BP (blood pressure) < 130/80     Hyperlipidemia LDL goal <130     Advanced directives, counseling/discussion     S/P TURP (status post transurethral resection of prostate)         Past Medical History        Past Medical History:   Diagnosis Date     BPH       Sleep apnea              Past Surgical History         Past Surgical History:   Procedure Laterality Date     CYSTOSCOPY, TRANSURETHRAL RESECTION (TUR) PROSTATE, COMBINED N/A 2016     Procedure: COMBINED CYSTOSCOPY, TRANSURETHRAL RESECTION (TUR) PROSTATE;  Surgeon: Pepe Srinivasan MD;  Location:  OR     [facility] NONSPECIFIC PROCEDURE        lumbar surgery            Family History         Family History   Problem Relation Age of Onset     Heart Disease Mother           alive age (1924)     Diabetes Father             "age 84     Cerebrovascular Disease Father       Cerebrovascular Disease Brother           alive age 57     Family History Negative Sister           alive age 60     Family History Negative Sister           alive age 55     Family History Negative Sister           alive age 52            Social History            Tobacco Use     Smoking status: Former Smoker       Quit date: 3/6/1973       Years since quittin.6     Smokeless tobacco: Never Used   Substance Use Topics     Alcohol use: Yes       Comment: occasional         Diet: regular, low salt/low fat  Physical Activity: active without specific exercise program  Depression Screen:    Over the past 2 weeks, patient has felt down, depressed, or hopeless:  No    Over the past 2 weeks, patient has felt little interest or pleasure in doing things: No     Functional ability/Safety screen:  Up and go test (able to get up and walk longer than 30 seconds): Passed  Patient needs assistance with: nothing  Patient's home has the following possible safety concerns: none identified  Patient has concerns about his hearing:  No  Cognitive Screen  Patient repeats three objects (ball, flag, tree)      Clock drawing test:   NORMAL  Recalls three objects after 3 minutes (ball,flag,tree):                                                                                               recalls 3 objects (3 points)     Physical Exam:          Vitals:    10/31/22 0953   BP: 108/62   BP Location: Right arm   Patient Position: Chair   Cuff Size: Adult Regular   Pulse: 63   SpO2: 97%   Weight: 232 lb (105.2 kg)   Height: 5' 7.5\" (1.715 m)        Body mass index is 35.8 kg/m .               GENERAL APPEARANCE: healthy, alert and no distress  PSYCH: Affect normal/bright.  Mentation within normal limits.  EYES: conjunctiva clear  HENT: ear canals and TM's normal.  Nose and mouth without ulcers, erythema or lesions  NECK: supple, nontender, no lymphadenopathy  RESP: lungs clear to auscultation - " no rales, rhonchi or wheezes  CV: regular rates and rhythm, normal S1 S2, no murmur noted  ABDOMEN:  soft, nontender, no HSM or masses and bowel sounds normal  SKIN: no suspicious lesions or rashes  NEURO: Normal strength and tone,  normal speech and mentation  Extremities: no peripheral edema or tenderness, peripheral pulses normal  MS: extremities normal- no gross deformities noted, no erythema, FROM noted in all extremities  PSYCH: mentation appears normal  LYMPHATICS: no cervical adenopathy        Component      Latest Ref Rng & Units 5/5/2020 9/15/2020 10/23/2020   WBC      4.0 - 11.0 10e3/uL 7.4       RBC Count      4.40 - 5.90 10e6/uL 4.76       Hemoglobin      13.3 - 17.7 g/dL 15.2       Hematocrit      40.0 - 53.0 % 45.1       MCV      78 - 100 fL 95       MCH      26.5 - 33.0 pg 31.9       MCHC      31.5 - 36.5 g/dL 33.7       RDW      10.0 - 15.0 % 12.6       Platelet Count      150 - 450 10e3/uL 177       % Neutrophils      % 65.6       % Lymphocytes      % 22.0       % Monocytes      % 7.8       % Eosinophils      % 4.3       % Basophils      % 0.3       Absolute Neutrophil      1.6 - 8.3 10e9/L 4.9       Absolute Lymphocytes      0.8 - 5.3 10e9/L 1.6       Absolute Monocytes      0.0 - 1.3 10e9/L 0.6       Absolute Eosinophils      0.0 - 0.7 10e9/L 0.3       Absolute Basophils      0.0 - 0.2 10e9/L 0.0       Diff Method       Automated Method       Absolute Neutrophils      1.6 - 8.3 10e3/uL         Absolute Lymphocytes      0.8 - 5.3 10e3/uL         Absolute Monocytes      0.0 - 1.3 10e3/uL         Absolute Eosinophils      0.0 - 0.7 10e3/uL         Absolute Basophils      0.0 - 0.2 10e3/uL         Sodium      133 - 144 mmol/L 140 138 141   Potassium      3.4 - 5.3 mmol/L 3.9 3.9 4.0   Chloride      94 - 109 mmol/L 106 106 107   Carbon Dioxide      20 - 32 mmol/L 27 28 28   Anion Gap      3 - 14 mmol/L 7 4 6   Glucose      70 - 99 mg/dL 110 (H) 100 (H) 98   Urea Nitrogen      7 - 30 mg/dL 13 14 16    Creatinine      0.66 - 1.25 mg/dL 1.06 1.33 (H) 1.17   GFR Estimate      >60 mL/min/1.73m2 68 52 (L) 60 (L)   GFR Estimate If Black      >60 mL/min/1.73:m2 79 60 (L) 70   Calcium      8.5 - 10.1 mg/dL 8.7 9.1 9.1   Bilirubin Direct      0.0 - 0.2 mg/dL 0.1       Bilirubin Total      0.2 - 1.3 mg/dL 0.4       Albumin      3.4 - 5.0 g/dL 3.6       Protein Total      6.8 - 8.8 g/dL 7.2       Alkaline Phosphatase      40 - 150 U/L 52       ALT      0 - 70 U/L 28       AST      0 - 45 U/L 17       Cholesterol      <200 mg/dL 125       Triglycerides      <150 mg/dL 94       HDL Cholesterol      >=40 mg/dL 39 (L)       LDL Cholesterol Calculated      <=100 mg/dL 67       Non HDL Cholesterol      <130 mg/dL 86       Patient Fasting > 8hrs?               Creatinine Urine      mg/dL 167   230   Albumin Urine mg/L      mg/L 18       Albumin Urine mg/g Cr      0.00 - 17.00 mg/g Cr 10.66       Sodium Urine mmol/L      mmol/L     146   %FENA      %     0.5   PSA      0.00 - 4.00 ug/L 3.98       Hemoglobin A1C      0.0 - 5.6 % 5.5 5.8 (H)     TSH      0.40 - 4.00 mU/L 2.86       T4 Free      0.76 - 1.46 ng/dL 0.86       Free T3      2.3 - 4.2 pg/mL 2.8          Component      Latest Ref Rng & Units 10/28/2021   WBC      4.0 - 11.0 10e3/uL 7.1   RBC Count      4.40 - 5.90 10e6/uL 4.67   Hemoglobin      13.3 - 17.7 g/dL 15.0   Hematocrit      40.0 - 53.0 % 45.3   MCV      78 - 100 fL 97   MCH      26.5 - 33.0 pg 32.1   MCHC      31.5 - 36.5 g/dL 33.1   RDW      10.0 - 15.0 % 12.7   Platelet Count      150 - 450 10e3/uL 171   % Neutrophils      % 66   % Lymphocytes      % 22   % Monocytes      % 7   % Eosinophils      % 5   % Basophils      % 0   Absolute Neutrophil      1.6 - 8.3 10e9/L     Absolute Lymphocytes      0.8 - 5.3 10e9/L     Absolute Monocytes      0.0 - 1.3 10e9/L     Absolute Eosinophils      0.0 - 0.7 10e9/L     Absolute Basophils      0.0 - 0.2 10e9/L     Diff Method           Absolute Neutrophils      1.6 - 8.3  10e3/uL 4.7   Absolute Lymphocytes      0.8 - 5.3 10e3/uL 1.6   Absolute Monocytes      0.0 - 1.3 10e3/uL 0.5   Absolute Eosinophils      0.0 - 0.7 10e3/uL 0.3   Absolute Basophils      0.0 - 0.2 10e3/uL 0.0   Sodium      133 - 144 mmol/L 138   Potassium      3.4 - 5.3 mmol/L 4.3   Chloride      94 - 109 mmol/L 107   Carbon Dioxide      20 - 32 mmol/L 27   Anion Gap      3 - 14 mmol/L 4   Glucose      70 - 99 mg/dL 114 (H)   Urea Nitrogen      7 - 30 mg/dL 15   Creatinine      0.66 - 1.25 mg/dL 1.16   GFR Estimate      >60 mL/min/1.73m2 61   GFR Estimate If Black      >60 mL/min/1.73:m2     Calcium      8.5 - 10.1 mg/dL 9.2   Bilirubin Direct      0.0 - 0.2 mg/dL     Bilirubin Total      0.2 - 1.3 mg/dL     Albumin      3.4 - 5.0 g/dL     Protein Total      6.8 - 8.8 g/dL     Alkaline Phosphatase      40 - 150 U/L     ALT      0 - 70 U/L     AST      0 - 45 U/L     Cholesterol      <200 mg/dL 122   Triglycerides      <150 mg/dL 81   HDL Cholesterol      >=40 mg/dL 40   LDL Cholesterol Calculated      <=100 mg/dL 66   Non HDL Cholesterol      <130 mg/dL 82   Patient Fasting > 8hrs?       Yes   Creatinine Urine      mg/dL 209   Albumin Urine mg/L      mg/L 115   Albumin Urine mg/g Cr      0.00 - 17.00 mg/g Cr 55.02 (H)   Sodium Urine mmol/L      mmol/L     %FENA      %     PSA      0.00 - 4.00 ug/L 4.01 (H)   Hemoglobin A1C      0.0 - 5.6 % 5.6   TSH      0.40 - 4.00 mU/L 2.72   T4 Free      0.76 - 1.46 ng/dL 0.84   Free T3      2.3 - 4.2 pg/mL 3.0          Component      Latest Ref Rng & Units 10/24/2022   WBC      4.0 - 11.0 10e3/uL 7.9   RBC Count      4.40 - 5.90 10e6/uL 4.83   Hemoglobin      13.3 - 17.7 g/dL 15.2   Hematocrit      40.0 - 53.0 % 46.3   MCV      78 - 100 fL 96   MCH      26.5 - 33.0 pg 31.5   MCHC      31.5 - 36.5 g/dL 32.8   RDW      10.0 - 15.0 % 12.9   Platelet Count      150 - 450 10e3/uL 189   % Neutrophils      % 65   % Lymphocytes      % 21   % Monocytes      % 8   % Eosinophils      % 5    % Basophils      % 0   Absolute Neutrophils      1.6 - 8.3 10e3/uL 5.2   Absolute Lymphocytes      0.8 - 5.3 10e3/uL 1.7   Absolute Monocytes      0.0 - 1.3 10e3/uL 0.6   Absolute Eosinophils      0.0 - 0.7 10e3/uL 0.4   Absolute Basophils      0.0 - 0.2 10e3/uL 0.0   Sodium      133 - 144 mmol/L 140   Potassium      3.4 - 5.3 mmol/L 4.0   Chloride      94 - 109 mmol/L 107   Carbon Dioxide      20 - 32 mmol/L 30   Anion Gap      3 - 14 mmol/L 3   Urea Nitrogen      7 - 30 mg/dL 13   Creatinine      0.66 - 1.25 mg/dL 1.20   Calcium      8.5 - 10.1 mg/dL 9.1   Glucose      70 - 99 mg/dL 122 (H)   Alkaline Phosphatase      40 - 150 U/L 50   AST      0 - 45 U/L 16   ALT      0 - 70 U/L 28   Protein Total      6.8 - 8.8 g/dL 7.1   Albumin      3.4 - 5.0 g/dL 3.5   Bilirubin Total      0.2 - 1.3 mg/dL 0.5   GFR Estimate      >60 mL/min/1.73m2 62   Cholesterol      <200 mg/dL 120   Triglycerides      <150 mg/dL 94   HDL Cholesterol      >=40 mg/dL 40   LDL Cholesterol Calculated      <=100 mg/dL 61   Non HDL Cholesterol      <130 mg/dL 80   Patient Fasting > 8hrs?       Yes   TSH      0.40 - 4.00 mU/L 1.95   T4 Free      0.76 - 1.46 ng/dL 0.79   Free T3      2.0 - 4.4 pg/mL 3.2   Hemoglobin A1C      0.0 - 5.6 % 5.8 (H)        End of Life Planning:   Patient currently has an advanced directive: Yes.  Practitioner is supportive of decision.     Education/Counseling:   Based on review of the above information, the following items were addressed:      Elevated blood pressure - follow-up plans made     Appropriate preventive services were discussed with this patient, including applicable screening as appropriate for cardiovascular disease, diabetes, osteopenia/osteoporosis, and glaucoma.  As appropriate for age/gender, discussed screening for colorectal cancer, prostate cancer, breast cancer, and cervical cancer.   Checklist reviewing preventive services available has been given to the patient.     A/P:        (Z00.00)  Medicare annual wellness visit, subsequent  (primary encounter diagnosis)  Comment: doing well  Plan: CBC with platelets differential, Comprehensive         metabolic panel, TSH, T4 free, T3 Free, Lipid         Profile        RTC one year for annual physical     (N28.9) Renal insufficiency, resolved  Comment: monitor  Plan: Comprehensive metabolic panel             (R73.01) IFG (impaired fasting glucose)  Comment: RTC six months for labs, see me one week later  Plan: Lipid Profile, Hemoglobin A1c             (E78.5) Hyperlipidemia LDL goal <130  Comment: at goal, RTC six months for labs, see me one week later  Plan: simvastatin (ZOCOR) 20 MG tablet, TSH, T4 free,        T3 Free, Lipid Profile             (Z12.5) Screening for prostate cancer  Comment: normal for age  Plan: Prostate Specific Antigen Screen             (N40.0) Benign non-nodular prostatic hyperplasia without lower urinary tract symptoms, S/P TURP  Comment: needs the below  Plan: terazosin (HYTRIN) 10 MG capsule            26 minutes total not spent on Medicare annual physical spent providing medical care on evaluation and management of preexisting items #2 onward as delineated below.

## 2023-02-16 NOTE — PROGRESS NOTES
Candelario Ruth is a 75 year old male who is presenting at the current time to discuss his diagnosi(es) of        Hyperlipidemia LDL goal <130  Benign non-nodular prostatic hyperplasia without lower urinary tract symptoms  IFG (impaired fasting glucose)  Screening for prostate cancer      HPI: The patient is a 75 year old year old male with htn, hyperlipidemia, IFG presenting for lab f/u.    Review Of Systems  Skin: negative  Eyes: negative  Ears/Nose/Throat: negative  Respiratory: No shortness of breath, dyspnea on exertion, cough, or hemoptysis  Cardiovascular: negative  Gastrointestinal: negative  Genitourinary: negative  Musculoskeletal: negative  Neurologic: negative  Psychiatric: negative  Hematologic/Lymphatic/Immunologic: negative  Endocrine: negative     PAST MEDICAL HISTORY:                  Past Medical History:   Diagnosis Date     BPH      Sleep apnea        PAST SURGICAL HISTORY:                  Past Surgical History:   Procedure Laterality Date     C NONSPECIFIC PROCEDURE  1993    lumbar surgery     CYSTOSCOPY, TRANSURETHRAL RESECTION (TUR) PROSTATE, COMBINED N/A 4/12/2016    Procedure: COMBINED CYSTOSCOPY, TRANSURETHRAL RESECTION (TUR) PROSTATE;  Surgeon: Pepe Srinivasan MD;  Location:  OR       CURRENT MEDICATIONS:                  Current Outpatient Medications   Medication Sig Dispense Refill     simvastatin (ZOCOR) 20 MG tablet TAKE 1 TABLET BY MOUTH AT BEDTIME 90 tablet 3     terazosin (HYTRIN) 10 MG capsule TAKE 1 CAPSULE BY MOUTH AT BEDTIME 90 capsule 3       ALLERGIES:                  Allergies   Allergen Reactions     Amoxicillin      Turns him purple       SOCIAL HISTORY:                  Social History     Socioeconomic History     Marital status:      Spouse name: Andra     Number of children: 3     Years of education: 14     Highest education level: Not on file   Occupational History     Occupation: [email or web address]     Employer: [employer]   Social Needs      Financial resource strain: Not on file     Food insecurity     Worry: Not on file     Inability: Not on file     Transportation needs     Medical: Not on file     Non-medical: Not on file   Tobacco Use     Smoking status: Former Smoker     Last attempt to quit: 3/6/1973     Years since quittin.2     Smokeless tobacco: Never Used   Substance and Sexual Activity     Alcohol use: Yes     Comment: occasional     Drug use: No     Sexual activity: Not on file   Lifestyle     Physical activity     Days per week: Not on file     Minutes per session: Not on file     Stress: Not on file   Relationships     Social connections     Talks on phone: Not on file     Gets together: Not on file     Attends Jew service: Not on file     Active member of club or organization: Not on file     Attends meetings of clubs or organizations: Not on file     Relationship status: Not on file     Intimate partner violence     Fear of current or ex partner: Not on file     Emotionally abused: Not on file     Physically abused: Not on file     Forced sexual activity: Not on file   Other Topics Concern     Parent/sibling w/ CABG, MI or angioplasty before 65F 55M? Not Asked   Social History Narrative     Not on file       FAMILY HISTORY:                   Family History   Problem Relation Age of Onset     Heart Disease Mother         alive age (1924)     Diabetes Father          age 84     Cerebrovascular Disease Father      Cerebrovascular Disease Brother         alive age 57     Family History Negative Sister         alive age 60     Family History Negative Sister         alive age 55     Family History Negative Sister         alive age 52           Physical exam was not performed as this is a [facility] mandated billable telephone encounter.     Component      Latest Ref Rng & Units 2019   WBC      4.0 - 11.0 10e9/L 7.4 7.4   RBC Count      4.4 - 5.9 10e12/L 4.80 4.76   Hemoglobin      13.3 - 17.7 g/dL 15.3  15.2   Hematocrit      40.0 - 53.0 % 44.9 45.1   MCV      78 - 100 fl 94 95   MCH      26.5 - 33.0 pg 31.9 31.9   MCHC      31.5 - 36.5 g/dL 34.1 33.7   RDW      10.0 - 15.0 % 12.7 12.6   Platelet Count      150 - 450 10e9/L 172 177   % Neutrophils      % 63.2 65.6   % Lymphocytes      % 23.8 22.0   % Monocytes      % 8.2 7.8   % Eosinophils      % 4.4 4.3   % Basophils      % 0.4 0.3   Absolute Neutrophil      1.6 - 8.3 10e9/L 4.7 4.9   Absolute Lymphocytes      0.8 - 5.3 10e9/L 1.8 1.6   Absolute Monocytes      0.0 - 1.3 10e9/L 0.6 0.6   Absolute Eosinophils      0.0 - 0.7 10e9/L 0.3 0.3   Absolute Basophils      0.0 - 0.2 10e9/L 0.0 0.0   Diff Method       Automated Method Automated Method   Sodium      133 - 144 mmol/L 138 140   Potassium      3.4 - 5.3 mmol/L 3.8 3.9   Chloride      94 - 109 mmol/L 105 106   Carbon Dioxide      20 - 32 mmol/L 31 27   Anion Gap      3 - 14 mmol/L 2 (L) 7   Glucose      70 - 99 mg/dL 118 (H) 110 (H)   Urea Nitrogen      7 - 30 mg/dL 15 13   Creatinine      0.66 - 1.25 mg/dL 1.17 1.06   GFR Estimate      >60 mL/min/1.73:m2 61 68   GFR Estimate If Black      >60 mL/min/1.73:m2 71 79   Calcium      8.5 - 10.1 mg/dL 9.1 8.7   Bilirubin Direct      0.0 - 0.2 mg/dL 0.2 0.1   Bilirubin Total      0.2 - 1.3 mg/dL 0.5 0.4   Albumin      3.4 - 5.0 g/dL 4.0 3.6   Protein Total      6.8 - 8.8 g/dL 7.1 7.2   Alkaline Phosphatase      40 - 150 U/L 51 52   ALT      0 - 70 U/L 36 28   AST      0 - 45 U/L 27 17   Cholesterol      <200 mg/dL 135 125   Triglycerides      <150 mg/dL 106 94   HDL Cholesterol      >39 mg/dL 41 39 (L)   LDL Cholesterol Calculated      <100 mg/dL 73 67   Non HDL Cholesterol      <130 mg/dL 94 86   Creatinine Urine      mg/dL  167   Albumin Urine mg/L      mg/L  18   Albumin Urine mg/g Cr      0 - 17 mg/g Cr  10.66   PSA      0 - 4 ug/L 3.55 3.98   TSH      0.40 - 4.00 mU/L 3.50 2.86   T4 Free      0.76 - 1.46 ng/dL 0.76 0.86   Free T3      2.3 - 4.2 pg/mL 2.8 2.8    Hemoglobin A1C      0 - 5.6 %  5.5       A/P:    (E78.5) Hyperlipidemia LDL goal <130  Comment: at goal  Plan: continue statin    (N40.0) Benign non-nodular prostatic hyperplasia without lower urinary tract symptoms, S/P TURP  Comment:urinating well  Plan: continue   hytrin    (R73.01) IFG (impaired fasting glucose)  Comment: avoid CHO, check labs in three months  Plan: Hemoglobin A1c, Basic metabolic panel            (Z12.5) Screening for prostate cancer  Comment: PSA normal, urinating wlel  Plan: continue Hytrin, RTC one year    Greater than one half the twenty five minutes total spent on the pt's visit were spent providing education and counselling to the patient regarding the above matters.         Rian

## 2023-10-02 ENCOUNTER — PATIENT OUTREACH (OUTPATIENT)
Dept: CARE COORDINATION | Facility: CLINIC | Age: 78
End: 2023-10-02
Payer: COMMERCIAL

## 2023-10-16 ENCOUNTER — PATIENT OUTREACH (OUTPATIENT)
Dept: CARE COORDINATION | Facility: CLINIC | Age: 78
End: 2023-10-16
Payer: COMMERCIAL

## 2023-10-22 DIAGNOSIS — E78.5 HYPERLIPIDEMIA LDL GOAL <130: ICD-10-CM

## 2023-10-23 RX ORDER — SIMVASTATIN 20 MG
20 TABLET ORAL AT BEDTIME
Qty: 90 TABLET | Refills: 0 | Status: SHIPPED | OUTPATIENT
Start: 2023-10-23 | End: 2023-11-16

## 2023-10-23 NOTE — TELEPHONE ENCOUNTER
"  simvastatin (ZOCOR) 20 MG tablet     Last Written Prescription Date:  10/31/22  Last Fill Quantity: 90,  # refills: 3    Last visit with provider:  10/31/22  Follow up scheduled:  11/16/23    Requested Prescriptions   Pending Prescriptions Disp Refills    simvastatin (ZOCOR) 20 MG tablet [Pharmacy Med Name: SIMVASTATIN 20MG TABLETS] 90 tablet 3     Sig: TAKE 1 TABLET(20 MG) BY MOUTH AT BEDTIME       Statins Protocol Passed - 10/22/2023  5:53 AM        Passed - LDL on file in past 12 months     Recent Labs   Lab Test 10/24/22  0808   LDL 61             Passed - No abnormal creatine kinase in past 12 months     No lab results found.             Passed - Recent (12 mo) or future (30 days) visit within the authorizing provider's specialty     Patient has had an office visit with the authorizing provider or a provider within the authorizing providers department within the previous 12 mos or has a future within next 30 days. See \"Patient Info\" tab in inbasket, or \"Choose Columns\" in Meds & Orders section of the refill encounter.              Passed - Medication is active on med list        Passed - Patient is age 18 or older           Prescription approved per [facility] Refill Protocol.         "

## 2023-11-02 ENCOUNTER — LAB (OUTPATIENT)
Dept: LAB | Facility: CLINIC | Age: 78
End: 2023-11-02
Payer: COMMERCIAL

## 2023-11-02 DIAGNOSIS — N28.9 RENAL INSUFFICIENCY: ICD-10-CM

## 2023-11-02 DIAGNOSIS — Z12.5 SCREENING FOR PROSTATE CANCER: ICD-10-CM

## 2023-11-02 DIAGNOSIS — E78.5 HYPERLIPIDEMIA LDL GOAL <130: ICD-10-CM

## 2023-11-02 DIAGNOSIS — R73.01 IFG (IMPAIRED FASTING GLUCOSE): ICD-10-CM

## 2023-11-02 LAB
ALBUMIN SERPL BCG-MCNC: 4.2 G/DL (ref 3.5–5.2)
ALP SERPL-CCNC: 51 U/L (ref 40–129)
ALT SERPL W P-5'-P-CCNC: 22 U/L (ref 0–70)
ANION GAP SERPL CALCULATED.3IONS-SCNC: 8 MMOL/L (ref 7–15)
APO A-I SERPL-MCNC: 18 MG/DL
AST SERPL W P-5'-P-CCNC: 24 U/L (ref 0–45)
BASOPHILS # BLD AUTO: 0 10E3/UL (ref 0–0.2)
BASOPHILS NFR BLD AUTO: 1 %
BILIRUB SERPL-MCNC: 0.3 MG/DL
BUN SERPL-MCNC: 13.5 MG/DL (ref 8–23)
CALCIUM SERPL-MCNC: 9.6 MG/DL (ref 8.8–10.2)
CHLORIDE SERPL-SCNC: 104 MMOL/L (ref 98–107)
CREAT SERPL-MCNC: 1.25 MG/DL (ref 0.67–1.17)
CREAT UR-MCNC: 108 MG/DL
CRP SERPL HS-MCNC: 1.05 MG/L
DEPRECATED HCO3 PLAS-SCNC: 29 MMOL/L (ref 22–29)
EGFRCR SERPLBLD CKD-EPI 2021: 59 ML/MIN/1.73M2
EOSINOPHIL # BLD AUTO: 0.4 10E3/UL (ref 0–0.7)
EOSINOPHIL NFR BLD AUTO: 5 %
ERYTHROCYTE [DISTWIDTH] IN BLOOD BY AUTOMATED COUNT: 12.2 % (ref 10–15)
GLUCOSE SERPL-MCNC: 113 MG/DL (ref 70–99)
HBA1C MFR BLD: 5.7 % (ref 0–5.6)
HCT VFR BLD AUTO: 45.9 % (ref 40–53)
HGB BLD-MCNC: 15.2 G/DL (ref 13.3–17.7)
IMM GRANULOCYTES # BLD: 0 10E3/UL
IMM GRANULOCYTES NFR BLD: 0 %
LYMPHOCYTES # BLD AUTO: 2.2 10E3/UL (ref 0.8–5.3)
LYMPHOCYTES NFR BLD AUTO: 29 %
MCH RBC QN AUTO: 31 PG (ref 26.5–33)
MCHC RBC AUTO-ENTMCNC: 33.1 G/DL (ref 31.5–36.5)
MCV RBC AUTO: 94 FL (ref 78–100)
MICROALBUMIN UR-MCNC: <12 MG/L
MICROALBUMIN/CREAT UR: NORMAL MG/G{CREAT}
MONOCYTES # BLD AUTO: 0.6 10E3/UL (ref 0–1.3)
MONOCYTES NFR BLD AUTO: 8 %
NEUTROPHILS # BLD AUTO: 4.5 10E3/UL (ref 1.6–8.3)
NEUTROPHILS NFR BLD AUTO: 58 %
PLATELET # BLD AUTO: 172 10E3/UL (ref 150–450)
POTASSIUM SERPL-SCNC: 4.6 MMOL/L (ref 3.4–5.3)
PROT SERPL-MCNC: 7 G/DL (ref 6.4–8.3)
PSA SERPL DL<=0.01 NG/ML-MCNC: 4.73 NG/ML (ref 0–6.5)
RBC # BLD AUTO: 4.9 10E6/UL (ref 4.4–5.9)
SODIUM SERPL-SCNC: 141 MMOL/L (ref 135–145)
T3FREE SERPL-MCNC: 3.3 PG/ML (ref 2–4.4)
T4 FREE SERPL-MCNC: 0.98 NG/DL (ref 0.9–1.7)
TSH SERPL DL<=0.005 MIU/L-ACNC: 3.44 UIU/ML (ref 0.3–4.2)
WBC # BLD AUTO: 7.7 10E3/UL (ref 4–11)

## 2023-11-02 PROCEDURE — 36415 COLL VENOUS BLD VENIPUNCTURE: CPT

## 2023-11-02 PROCEDURE — 83695 ASSAY OF LIPOPROTEIN(A): CPT

## 2023-11-02 PROCEDURE — 84443 ASSAY THYROID STIM HORMONE: CPT

## 2023-11-02 PROCEDURE — 84481 FREE ASSAY (FT-3): CPT

## 2023-11-02 PROCEDURE — 82570 ASSAY OF URINE CREATININE: CPT

## 2023-11-02 PROCEDURE — 84439 ASSAY OF FREE THYROXINE: CPT

## 2023-11-02 PROCEDURE — 83704 LIPOPROTEIN BLD QUAN PART: CPT | Mod: 90

## 2023-11-02 PROCEDURE — 99000 SPECIMEN HANDLING OFFICE-LAB: CPT

## 2023-11-02 PROCEDURE — G0103 PSA SCREENING: HCPCS

## 2023-11-02 PROCEDURE — 80053 COMPREHEN METABOLIC PANEL: CPT

## 2023-11-02 PROCEDURE — 80061 LIPID PANEL: CPT | Mod: 90

## 2023-11-02 PROCEDURE — 85025 COMPLETE CBC W/AUTO DIFF WBC: CPT

## 2023-11-02 PROCEDURE — 83036 HEMOGLOBIN GLYCOSYLATED A1C: CPT

## 2023-11-02 PROCEDURE — 82043 UR ALBUMIN QUANTITATIVE: CPT

## 2023-11-02 PROCEDURE — 86141 C-REACTIVE PROTEIN HS: CPT

## 2023-11-05 LAB
CHOLEST SERPL-MCNC: 122 MG/DL
HDL SERPL QN: 8.5 NM
HDL SERPL-SCNC: 29.4 UMOL/L
HDLC SERPL-MCNC: 40 MG/DL
HLD.LARGE SERPL-SCNC: <2.8 UMOL/L
LDL SERPL QN: 20.5 NM
LDL SERPL-SCNC: 1021 NMOL/L
LDL SMALL SERPL-SCNC: 604 NMOL/L
LDLC SERPL CALC-MCNC: 64 MG/DL
PATHOLOGY STUDY: ABNORMAL
TRIGL SERPL-MCNC: 89 MG/DL
VLDL LARGE SERPL-SCNC: 3.6 NMOL/L
VLDL SERPL QN: 50.6 NM

## 2023-11-16 ENCOUNTER — OFFICE VISIT (OUTPATIENT)
Dept: OTHER | Facility: CLINIC | Age: 78
End: 2023-11-16
Attending: INTERNAL MEDICINE
Payer: COMMERCIAL

## 2023-11-16 VITALS
BODY MASS INDEX: 33.49 KG/M2 | HEIGHT: 67 IN | SYSTOLIC BLOOD PRESSURE: 128 MMHG | OXYGEN SATURATION: 98 % | WEIGHT: 213.4 LBS | DIASTOLIC BLOOD PRESSURE: 78 MMHG | HEART RATE: 60 BPM

## 2023-11-16 DIAGNOSIS — R73.01 IFG (IMPAIRED FASTING GLUCOSE): ICD-10-CM

## 2023-11-16 DIAGNOSIS — N40.0 BENIGN NON-NODULAR PROSTATIC HYPERPLASIA WITHOUT LOWER URINARY TRACT SYMPTOMS: ICD-10-CM

## 2023-11-16 DIAGNOSIS — N28.9 RENAL INSUFFICIENCY: ICD-10-CM

## 2023-11-16 DIAGNOSIS — E78.5 HYPERLIPIDEMIA LDL GOAL <130: ICD-10-CM

## 2023-11-16 DIAGNOSIS — Z00.00 MEDICARE ANNUAL WELLNESS VISIT, SUBSEQUENT: Primary | ICD-10-CM

## 2023-11-16 PROCEDURE — G0439 PPPS, SUBSEQ VISIT: HCPCS | Performed by: INTERNAL MEDICINE

## 2023-11-16 PROCEDURE — 99213 OFFICE O/P EST LOW 20 MIN: CPT | Mod: 25 | Performed by: INTERNAL MEDICINE

## 2023-11-16 PROCEDURE — G0463 HOSPITAL OUTPT CLINIC VISIT: HCPCS | Performed by: INTERNAL MEDICINE

## 2023-11-16 RX ORDER — SIMVASTATIN 20 MG
20 TABLET ORAL AT BEDTIME
Qty: 90 TABLET | Refills: 3 | Status: SHIPPED | OUTPATIENT
Start: 2023-11-16

## 2023-11-16 RX ORDER — VITAMIN A ACETATE, .BETA.-CAROTENE, ASCORBIC ACID, CHOLECALCIFEROL, .ALPHA.-TOCOPHEROL ACETATE, DL-, THIAMINE MONONITRATE, RIBOFLAVIN, NIACINAMIDE, PYRIDOXINE HYDROCHLORIDE, FOLIC ACID, CYANOCOBALAMIN, CALCIUM CARBONATE, FERROUS FUMARATE, ZINC OXIDE, AND CUPRIC OXIDE 2000; 2000; 120; 400; 22; 1.84; 3; 20; 10; 1; 12; 200; 27; 25; 2 [IU]/1; [IU]/1; MG/1; [IU]/1; MG/1; MG/1; MG/1; MG/1; MG/1; MG/1; UG/1; MG/1; MG/1; MG/1; MG/1
1 TABLET ORAL DAILY
COMMUNITY
End: 2024-04-15

## 2023-11-16 RX ORDER — TERAZOSIN 10 MG/1
CAPSULE ORAL
Qty: 90 CAPSULE | Refills: 3 | Status: SHIPPED | OUTPATIENT
Start: 2023-11-16

## 2023-11-16 NOTE — PROGRESS NOTES
"Patient is here to discuss follow up    /78 (BP Location: Right arm, Patient Position: Chair, Cuff Size: Adult Large)   Pulse 60   Ht 5' 6.5\" (1.689 m)   Wt 213 lb 6.4 oz (96.8 kg)   SpO2 98%   BMI 33.93 kg/m      Questions patient would like addressed today are: N/A.    Refills are needed: No    Has homecare services and agency name:  Judy [facility]    "

## 2023-11-16 NOTE — PROGRESS NOTES
Candelario Ruth is a 78 year old male who presents for        Medicare annual wellness visit, subsequent  Renal insufficiency  IFG (impaired fasting glucose)  Hyperlipidemia LDL goal <130  Screening for prostate cancer  Benign non-nodular prostatic hyperplasia without lower urinary tract symptoms           HPI: Candelario Ruth is a 78 year old male with htn, HLD, IFG presenting for lab f/u as well as to address meds.     Review Of Systems  Skin: negative  Eyes: negative  Ears/Nose/Throat: negative  Respiratory: No shortness of breath, dyspnea on exertion, cough, or hemoptysis  Cardiovascular: negative  Gastrointestinal: negative  Genitourinary: negative  Musculoskeletal: negative  Neurologic: negative  Psychiatric: negative  Hematologic/Lymphatic/Immunologic: negative  Endocrine: negative        Current providers caring for this patient include:  Patient Care Team:  Trenton Thomas MD as PCP - General  Trenton Thomas MD as Assigned Heart and Vascular Provider     Complete Medical and Social history reviewed with patient, outlined below.           Patient Active Problem List   Diagnosis    Hypertension goal BP (blood pressure) < 130/80    Hyperlipidemia LDL goal <130    Advanced directives, counseling/discussion    S/P TURP (status post transurethral resection of prostate)         Past Medical History           Past Medical History:   Diagnosis Date    BPH      Sleep apnea              Past Surgical History             Past Surgical History:   Procedure Laterality Date    CYSTOSCOPY, TRANSURETHRAL RESECTION (TUR) PROSTATE, COMBINED N/A 4/12/2016     Procedure: COMBINED CYSTOSCOPY, TRANSURETHRAL RESECTION (TUR) PROSTATE;  Surgeon: Pepe Srinivasan MD;  Location:  OR    [facility] NONSPECIFIC PROCEDURE   1993     lumbar surgery            Family History             Family History   Problem Relation Age of Onset    Heart Disease Mother           alive age ([birth year]24)    Diabetes Father            " age 84    Cerebrovascular Disease Father      Cerebrovascular Disease Brother           alive age 57    Family History Negative Sister           alive age 60    Family History Negative Sister           alive age 55    Family History Negative Sister           alive age 52            Social History                Tobacco Use    Smoking status: Former Smoker       Quit date: 3/6/1973       Years since quittin.6    Smokeless tobacco: Never Used   Substance Use Topics    Alcohol use: Yes       Comment: occasional         Diet: regular, low salt/low fat  Physical Activity: active without specific exercise program  Depression Screen:    Over the past 2 weeks, patient has felt down, depressed, or hopeless:  No    Over the past 2 weeks, patient has felt little interest or pleasure in doing things: No     Functional ability/Safety screen:  Up and go test (able to get up and walk longer than 30 seconds): Passed  Patient needs assistance with: nothing  Patient's home has the following possible safety concerns: none identified  Patient has concerns about his hearing:  No  Cognitive Screen  Patient repeats three objects (ball, flag, tree)      Clock drawing test:   NORMAL  Recalls three objects after 3 minutes (ball,flag,tree):                                                                                               recalls 3 objects (3 points)     Physical Exam:           Vitals       Vitals:     10/31/22 0953   BP: 108/62   BP Location: Right arm   Patient Position: Chair   Cuff Size: Adult Regular   Pulse: 63   SpO2: 97%   Weight: 232 lb (105.2 kg)   Height: 5' 7.5\" (1.715 m)             Body mass index is 35.8 kg/m .               GENERAL APPEARANCE: healthy, alert and no distress  PSYCH: Affect normal/bright.  Mentation within normal limits.  EYES: conjunctiva clear  HENT: ear canals and TM's normal.  Nose and mouth without ulcers, erythema or lesions  NECK: supple, nontender, no lymphadenopathy  RESP: " lungs clear to auscultation - no rales, rhonchi or wheezes  CV: regular rates and rhythm, normal S1 S2, no murmur noted  ABDOMEN:  soft, nontender, no HSM or masses and bowel sounds normal  SKIN: no suspicious lesions or rashes  NEURO: Normal strength and tone,  normal speech and mentation  Extremities: no peripheral edema or tenderness, peripheral pulses normal  MS: extremities normal- no gross deformities noted, no erythema, FROM noted in all extremities  PSYCH: mentation appears normal  LYMPHATICS: no cervical adenopathy        Component      Latest Ref Rng & Units 5/5/2020 9/15/2020 10/23/2020   WBC      4.0 - 11.0 10e3/uL 7.4       RBC Count      4.40 - 5.90 10e6/uL 4.76       Hemoglobin      13.3 - 17.7 g/dL 15.2       Hematocrit      40.0 - 53.0 % 45.1       MCV      78 - 100 fL 95       MCH      26.5 - 33.0 pg 31.9       MCHC      31.5 - 36.5 g/dL 33.7       RDW      10.0 - 15.0 % 12.6       Platelet Count      150 - 450 10e3/uL 177       % Neutrophils      % 65.6       % Lymphocytes      % 22.0       % Monocytes      % 7.8       % Eosinophils      % 4.3       % Basophils      % 0.3       Absolute Neutrophil      1.6 - 8.3 10e9/L 4.9       Absolute Lymphocytes      0.8 - 5.3 10e9/L 1.6       Absolute Monocytes      0.0 - 1.3 10e9/L 0.6       Absolute Eosinophils      0.0 - 0.7 10e9/L 0.3       Absolute Basophils      0.0 - 0.2 10e9/L 0.0       Diff Method       Automated Method       Absolute Neutrophils      1.6 - 8.3 10e3/uL         Absolute Lymphocytes      0.8 - 5.3 10e3/uL         Absolute Monocytes      0.0 - 1.3 10e3/uL         Absolute Eosinophils      0.0 - 0.7 10e3/uL         Absolute Basophils      0.0 - 0.2 10e3/uL         Sodium      133 - 144 mmol/L 140 138 141   Potassium      3.4 - 5.3 mmol/L 3.9 3.9 4.0   Chloride      94 - 109 mmol/L 106 106 107   Carbon Dioxide      20 - 32 mmol/L 27 28 28   Anion Gap      3 - 14 mmol/L 7 4 6   Glucose      70 - 99 mg/dL 110 (H) 100 (H) 98   Urea  Nitrogen      7 - 30 mg/dL 13 14 16   Creatinine      0.66 - 1.25 mg/dL 1.06 1.33 (H) 1.17   GFR Estimate      >60 mL/min/1.73m2 68 52 (L) 60 (L)   GFR Estimate If Black      >60 mL/min/1.73:m2 79 60 (L) 70   Calcium      8.5 - 10.1 mg/dL 8.7 9.1 9.1   Bilirubin Direct      0.0 - 0.2 mg/dL 0.1       Bilirubin Total      0.2 - 1.3 mg/dL 0.4       Albumin      3.4 - 5.0 g/dL 3.6       Protein Total      6.8 - 8.8 g/dL 7.2       Alkaline Phosphatase      40 - 150 U/L 52       ALT      0 - 70 U/L 28       AST      0 - 45 U/L 17       Cholesterol      <200 mg/dL 125       Triglycerides      <150 mg/dL 94       HDL Cholesterol      >=40 mg/dL 39 (L)       LDL Cholesterol Calculated      <=100 mg/dL 67       Non HDL Cholesterol      <130 mg/dL 86       Patient Fasting > 8hrs?               Creatinine Urine      mg/dL 167   230   Albumin Urine mg/L      mg/L 18       Albumin Urine mg/g Cr      0.00 - 17.00 mg/g Cr 10.66       Sodium Urine mmol/L      mmol/L     146   %FENA      %     0.5   PSA      0.00 - 4.00 ug/L 3.98       Hemoglobin A1C      0.0 - 5.6 % 5.5 5.8 (H)     TSH      0.40 - 4.00 mU/L 2.86       T4 Free      0.76 - 1.46 ng/dL 0.86       Free T3      2.3 - 4.2 pg/mL 2.8          Component      Latest Ref Rng & Units 10/28/2021   WBC      4.0 - 11.0 10e3/uL 7.1   RBC Count      4.40 - 5.90 10e6/uL 4.67   Hemoglobin      13.3 - 17.7 g/dL 15.0   Hematocrit      40.0 - 53.0 % 45.3   MCV      78 - 100 fL 97   MCH      26.5 - 33.0 pg 32.1   MCHC      31.5 - 36.5 g/dL 33.1   RDW      10.0 - 15.0 % 12.7   Platelet Count      150 - 450 10e3/uL 171   % Neutrophils      % 66   % Lymphocytes      % 22   % Monocytes      % 7   % Eosinophils      % 5   % Basophils      % 0   Absolute Neutrophil      1.6 - 8.3 10e9/L     Absolute Lymphocytes      0.8 - 5.3 10e9/L     Absolute Monocytes      0.0 - 1.3 10e9/L     Absolute Eosinophils      0.0 - 0.7 10e9/L     Absolute Basophils      0.0 - 0.2 10e9/L     Diff Method            Absolute Neutrophils      1.6 - 8.3 10e3/uL 4.7   Absolute Lymphocytes      0.8 - 5.3 10e3/uL 1.6   Absolute Monocytes      0.0 - 1.3 10e3/uL 0.5   Absolute Eosinophils      0.0 - 0.7 10e3/uL 0.3   Absolute Basophils      0.0 - 0.2 10e3/uL 0.0   Sodium      133 - 144 mmol/L 138   Potassium      3.4 - 5.3 mmol/L 4.3   Chloride      94 - 109 mmol/L 107   Carbon Dioxide      20 - 32 mmol/L 27   Anion Gap      3 - 14 mmol/L 4   Glucose      70 - 99 mg/dL 114 (H)   Urea Nitrogen      7 - 30 mg/dL 15   Creatinine      0.66 - 1.25 mg/dL 1.16   GFR Estimate      >60 mL/min/1.73m2 61   GFR Estimate If Black      >60 mL/min/1.73:m2     Calcium      8.5 - 10.1 mg/dL 9.2   Bilirubin Direct      0.0 - 0.2 mg/dL     Bilirubin Total      0.2 - 1.3 mg/dL     Albumin      3.4 - 5.0 g/dL     Protein Total      6.8 - 8.8 g/dL     Alkaline Phosphatase      40 - 150 U/L     ALT      0 - 70 U/L     AST      0 - 45 U/L     Cholesterol      <200 mg/dL 122   Triglycerides      <150 mg/dL 81   HDL Cholesterol      >=40 mg/dL 40   LDL Cholesterol Calculated      <=100 mg/dL 66   Non HDL Cholesterol      <130 mg/dL 82   Patient Fasting > 8hrs?       Yes   Creatinine Urine      mg/dL 209   Albumin Urine mg/L      mg/L 115   Albumin Urine mg/g Cr      0.00 - 17.00 mg/g Cr 55.02 (H)   Sodium Urine mmol/L      mmol/L     %FENA      %     PSA      0.00 - 4.00 ug/L 4.01 (H)   Hemoglobin A1C      0.0 - 5.6 % 5.6   TSH      0.40 - 4.00 mU/L 2.72   T4 Free      0.76 - 1.46 ng/dL 0.84   Free T3      2.3 - 4.2 pg/mL 3.0            Component      Latest Ref Rng & Units 10/24/2022   WBC      4.0 - 11.0 10e3/uL 7.9   RBC Count      4.40 - 5.90 10e6/uL 4.83   Hemoglobin      13.3 - 17.7 g/dL 15.2   Hematocrit      40.0 - 53.0 % 46.3   MCV      78 - 100 fL 96   MCH      26.5 - 33.0 pg 31.5   MCHC      31.5 - 36.5 g/dL 32.8   RDW      10.0 - 15.0 % 12.9   Platelet Count      150 - 450 10e3/uL 189   % Neutrophils      % 65   % Lymphocytes      % 21   %  Monocytes      % 8   % Eosinophils      % 5   % Basophils      % 0   Absolute Neutrophils      1.6 - 8.3 10e3/uL 5.2   Absolute Lymphocytes      0.8 - 5.3 10e3/uL 1.7   Absolute Monocytes      0.0 - 1.3 10e3/uL 0.6   Absolute Eosinophils      0.0 - 0.7 10e3/uL 0.4   Absolute Basophils      0.0 - 0.2 10e3/uL 0.0   Sodium      133 - 144 mmol/L 140   Potassium      3.4 - 5.3 mmol/L 4.0   Chloride      94 - 109 mmol/L 107   Carbon Dioxide      20 - 32 mmol/L 30   Anion Gap      3 - 14 mmol/L 3   Urea Nitrogen      7 - 30 mg/dL 13   Creatinine      0.66 - 1.25 mg/dL 1.20   Calcium      8.5 - 10.1 mg/dL 9.1   Glucose      70 - 99 mg/dL 122 (H)   Alkaline Phosphatase      40 - 150 U/L 50   AST      0 - 45 U/L 16   ALT      0 - 70 U/L 28   Protein Total      6.8 - 8.8 g/dL 7.1   Albumin      3.4 - 5.0 g/dL 3.5   Bilirubin Total      0.2 - 1.3 mg/dL 0.5   GFR Estimate      >60 mL/min/1.73m2 62   Cholesterol      <200 mg/dL 120   Triglycerides      <150 mg/dL 94   HDL Cholesterol      >=40 mg/dL 40   LDL Cholesterol Calculated      <=100 mg/dL 61   Non HDL Cholesterol      <130 mg/dL 80   Patient Fasting > 8hrs?       Yes   TSH      0.40 - 4.00 mU/L 1.95   T4 Free      0.76 - 1.46 ng/dL 0.79   Free T3      2.0 - 4.4 pg/mL 3.2   Hemoglobin A1C      0.0 - 5.6 % 5.8 (H)        Component      Latest Ref [facility] 11/2/2023  7:41 AM 11/2/2023  8:04 AM   WBC      4.0 - 11.0 10e3/uL 7.7     RBC Count      4.40 - 5.90 10e6/uL 4.90     Hemoglobin      13.3 - 17.7 g/dL 15.2     Hematocrit      40.0 - 53.0 % 45.9     MCV      78 - 100 fL 94     MCH      26.5 - 33.0 pg 31.0     MCHC      31.5 - 36.5 g/dL 33.1     RDW      10.0 - 15.0 % 12.2     Platelet Count      150 - 450 10e3/uL 172     % Neutrophils      % 58     % Lymphocytes      % 29     % Monocytes      % 8     % Eosinophils      % 5     % Basophils      % 1     % Immature Granulocytes      % 0     Absolute Neutrophils      1.6 - 8.3 10e3/uL 4.5     Absolute Lymphocytes      0.8 - 5.3  10e3/uL 2.2     Absolute Monocytes      0.0 - 1.3 10e3/uL 0.6     Absolute Eosinophils      0.0 - 0.7 10e3/uL 0.4     Absolute Basophils      0.0 - 0.2 10e3/uL 0.0     Absolute Immature Granulocytes      <=0.4 10e3/uL 0.0     Sodium      135 - 145 mmol/L 141     Potassium      3.4 - 5.3 mmol/L 4.6     Carbon Dioxide (CO2)      22 - 29 mmol/L 29     Anion Gap      7 - 15 mmol/L 8     Urea Nitrogen      8.0 - 23.0 mg/dL 13.5     Creatinine      0.67 - 1.17 mg/dL 1.25 (H)     GFR Estimate      >60 mL/min/1.73m2 59 (L)     Calcium      8.8 - 10.2 mg/dL 9.6     Chloride      98 - 107 mmol/L 104     Glucose      70 - 99 mg/dL 113 (H)     Alkaline Phosphatase      40 - 129 U/L 51     AST      0 - 45 U/L 24     ALT      0 - 70 U/L 22     Protein Total      6.4 - 8.3 g/dL 7.0     Albumin      3.5 - 5.2 g/dL 4.2     Bilirubin Total      <=1.2 mg/dL 0.3     Total Cholesterol      <=199 mg/dL 122     Triglycerides      30 - 149 mg/dL 89     HDL Cholesterol      40 - 59 mg/dL 40     LDL Cholesterol      <=129 mg/dL 64     HDL Size      >=8.9 nm 8.5 (L)     VLDL Size      <=46.7 nm 50.6 (H)     LDL Particle Size      >=20.7 nm 20.5 (L)     Lge HDL Particle number      >=4.2 umol/L <2.8 (L)     HDL Particle Number NMR      >=33.0 umol/L 29.4 (L)     Lge VLDL Part number      <=2.7 nmol/L 3.6 (H)     Small LDL Particle number      <=634 nmol/L 604     LDL Particle Number      <=1135 nmol/L 1021     EER LipoFit by NMR See Note     Creatinine Urine      mg/dL  108.0    Albumin Urine mg/L      mg/L  <12.0    Albumin Urine mg/g Cr  --    C-Reactive Protein High Sensitivity 1.05     Hemoglobin A1C      0.0 - 5.6 % 5.7 (H)     Lipoprotein (a)      <30 mg/dL 18     PSA Tumor Marker      0.00 - 6.50 ng/mL 4.73     Free T3      2.0 - 4.4 pg/mL 3.3     T4 Free      0.90 - 1.70 ng/dL 0.98     TSH      0.30 - 4.20 uIU/mL 3.44        Legend:  (H) High  (L) Low     End of Life Planning:   Patient currently has an advanced directive: Yes.   Practitioner is supportive of decision.     Education/Counseling:   Based on review of the above information, the following items were addressed:      Elevated blood pressure - follow-up plans made     Appropriate preventive services were discussed with this patient, including applicable screening as appropriate for cardiovascular disease, diabetes, osteopenia/osteoporosis, and glaucoma.  As appropriate for age/gender, discussed screening for colorectal cancer, prostate cancer, breast cancer, and cervical cancer.   Checklist reviewing preventive services available has been given to the patient.     A/P:        (Z00.00) Medicare annual wellness visit, subsequent  (primary encounter diagnosis)  Comment: doing well  Plan: CBC with platelets differential, Comprehensive         metabolic panel, TSH, T4 free, T3 Free, Lipid         Profile        RTC one year for annual physical     (N28.9) Renal insufficiency, resolved  Comment: monitor  Plan: Comprehensive metabolic panel             (R73.01) IFG (impaired fasting glucose)  Comment: RTC six months for labs, see me one week later  Plan: Lipid Profile, Hemoglobin A1c             (E78.5) Hyperlipidemia LDL goal <130  Comment: at goal, RTC six months for labs, see me one week later  Plan: simvastatin (ZOCOR) 20 MG tablet, TSH, T4 free,        T3 Free, Lipid Profile             (Z12.5) Screening for prostate cancer  Comment: normal for age  Plan: Prostate Specific Antigen Screen             (N40.0) Benign non-nodular prostatic hyperplasia without lower urinary tract symptoms, S/P TURP  Comment: needs the below  Plan: terazosin (HYTRIN) 10 MG capsule            26 minutes total not spent on Medicare annual physical spent providing medical care on evaluation and management of preexisting items #2 onward as delineated below.

## 2024-04-15 ENCOUNTER — OFFICE VISIT (OUTPATIENT)
Dept: FAMILY MEDICINE | Facility: CLINIC | Age: 79
End: 2024-04-15
Payer: COMMERCIAL

## 2024-04-15 VITALS
HEIGHT: 67 IN | SYSTOLIC BLOOD PRESSURE: 124 MMHG | OXYGEN SATURATION: 95 % | HEART RATE: 62 BPM | DIASTOLIC BLOOD PRESSURE: 75 MMHG | TEMPERATURE: 98.3 F | RESPIRATION RATE: 16 BRPM | BODY MASS INDEX: 33.15 KG/M2 | WEIGHT: 211.2 LBS

## 2024-04-15 DIAGNOSIS — Z01.818 PREOPERATIVE EXAMINATION: Primary | ICD-10-CM

## 2024-04-15 DIAGNOSIS — H25.9 SENILE CATARACT OF LEFT EYE, UNSPECIFIED AGE-RELATED CATARACT TYPE: ICD-10-CM

## 2024-04-15 PROCEDURE — 99214 OFFICE O/P EST MOD 30 MIN: CPT | Performed by: FAMILY MEDICINE

## 2024-04-15 ASSESSMENT — PAIN SCALES - GENERAL: PAINLEVEL: NO PAIN (0)

## 2024-04-15 NOTE — PROGRESS NOTES
Preoperative Evaluation  [address] [address]  Phone: [phone]  Primary Provider: Trenton Thomas  Pre-op Performing Provider: LAMAR HSIEH  Apr 15, 2024       Candelario is a 79 year old, presenting for the following:  Pre-Op Exam (Left eye cataract surgery)        4/15/2024     1:12 PM   Additional Questions   Roomed by Anne WATSON     Surgical Information  Surgery/Procedure: Cataract surgery Left eye   Surgery Location: [facility]   Surgeon: TBD  Surgery Date: 04/30/2024  Time of Surgery: TBD  Where patient plans to recover: At home with family  Fax number for surgical facility: [phone]    Assessment & Plan     The proposed surgical procedure is considered LOW risk.    Preoperative examination      Senile cataract of left eye, unspecified age-related cataract type        - No identified additional risk factors other than previously addressed    Antiplatelet or Anticoagulation Medication Instructions   - Patient is on no antiplatelet or anticoagulation medications.    Additional Medication Instructions   - Statins: Continue taking on the day of surgery.     Recommendation  APPROVAL GIVEN to proceed with proposed procedure, without further diagnostic evaluation.    Subjective         4/15/2024    11:49 AM   Preop Questions   1. Have you ever had a heart attack or stroke? No   2. Have you ever had surgery on your heart or blood vessels, such as a stent placement, a coronary artery bypass, or surgery on an artery in your head, neck, heart, or legs? No   3. Do you have chest pain with activity? No   4. Do you have a history of  heart failure? No   5. Do you currently have a cold, bronchitis or symptoms of other infection? No   6. Do you have a cough, shortness of breath, or wheezing? No   7. Do you or anyone in your family have previous history of blood clots? UNKNOWN    8. Do you or does anyone in your family have a serious  bleeding problem such as prolonged bleeding following surgeries or cuts? No   9. Have you ever had problems with anemia or been told to take iron pills? No   10. Have you had any abnormal blood loss such as black, tarry or bloody stools? No   11. Have you ever had a blood transfusion? UNKNOWN    12. Are you willing to have a blood transfusion if it is medically needed before, during, or after your surgery? NO   13. Have you or any of your relatives ever had problems with anesthesia? No   14. Do you have sleep apnea, excessive snoring or daytime drowsiness? No   15. Do you have any artifical heart valves or other implanted medical devices like a pacemaker, defibrillator, or continuous glucose monitor? No   16. Do you have artificial joints? No   17. Are you allergic to latex? No       Health Care Directive  Patient does not have a Health Care Directive or Living Will: Discussed advance care planning with patient; however, patient declined at this time.    Preoperative Review of    reviewed - no record of controlled substances prescribed.      Status of Chronic Conditions:  See problem list for active medical problems.  Problems all longstanding and stable, except as noted/documented.  See ROS for pertinent symptoms related to these conditions.    Patient Active Problem List    Diagnosis Date Noted    S/P TURP (status post transurethral resection of prostate) 04/12/2016     Priority: Medium    Advanced directives, counseling/discussion 09/10/2012     Priority: Medium     Discussed advance care planning with patient; information given to patient to review. 9/10/2012         Hypertension goal BP (blood pressure) < 130/80 03/05/2012     Priority: Medium    Hyperlipidemia LDL goal <130 03/05/2012     Priority: Medium      Past Medical History:   Diagnosis Date    BPH     Sleep apnea      Past Surgical History:   Procedure Laterality Date    CYSTOSCOPY, TRANSURETHRAL RESECTION (TUR) PROSTATE, COMBINED N/A 4/12/2016  "   Procedure: COMBINED CYSTOSCOPY, TRANSURETHRAL RESECTION (TUR) PROSTATE;  Surgeon: Pepe Srinivasan MD;  Location:  OR    [facility] NONSPECIFIC PROCEDURE      lumbar surgery     Current Outpatient Medications   Medication Sig Dispense Refill    cyanocobalamin (VITAMIN B-12) 500 MCG SUBL sublingual tablet Place 500 mcg under the tongue daily      Prenatal Vit-Fe Fumarate-FA (PNV PRENATAL PLUS MULTIVITAMIN) 27-1 MG TABS per tablet Take 1 tablet by mouth daily      simvastatin (ZOCOR) 20 MG tablet Take 1 tablet (20 mg) by mouth at bedtime 90 tablet 3    terazosin (HYTRIN) 10 MG capsule TAKE 1 CAPSULE BY MOUTH AT BEDTIME 90 capsule 3       Allergies   Allergen Reactions    Amoxicillin      Turns him purple        Social History     Tobacco Use    Smoking status: Former     Current packs/day: 0.00     Types: Cigarettes     Quit date: 3/6/1973     Years since quittin.1    Smokeless tobacco: Never   Substance Use Topics    Alcohol use: Not Currently     Comment: occasional     Family History   Problem Relation Age of Onset    Heart Disease Mother         alive age (1924)    Diabetes Father          age 84    Cerebrovascular Disease Father     Cerebrovascular Disease Brother         alive age 57    Family History Negative Sister         alive age 60    Family History Negative Sister         alive age 55    Family History Negative Sister         alive age 52     History   Drug Use No         Review of Systems    Review of Systems  Constitutional, HEENT, cardiovascular, pulmonary, GI, , musculoskeletal, neuro, skin, endocrine and psych systems are negative, except as otherwise noted.    Objective    /75   Pulse 62   Temp 98.3  F (36.8  C) (Tympanic)   Resp 16   Ht 1.71 m (5' 7.32\")   Wt 95.8 kg (211 lb 3.2 oz)   SpO2 95%   BMI 32.76 kg/m     Estimated body mass index is 32.76 kg/m  as calculated from the following:    Height as of this encounter: 1.71 m (5' 7.32\").    Weight as of this " encounter: 95.8 kg (211 lb 3.2 oz).  Physical Exam  GENERAL: alert and no distress  NECK: no adenopathy, no asymmetry, masses, or scars  RESP: lungs clear to auscultation - no rales, rhonchi or wheezes  CV: regular rate and rhythm, normal S1 S2, no S3 or S4, no murmur, click or rub, no peripheral edema  ABDOMEN: soft, nontender, no hepatosplenomegaly, no masses and bowel sounds normal  MS: no gross musculoskeletal defects noted, no edema    Recent Labs   Lab Test 11/02/23  0741 10/24/22  0808   HGB 15.2 15.2    189    140   POTASSIUM 4.6 4.0   CR 1.25* 1.20   A1C 5.7* 5.8*        Diagnostics  No labs were ordered during this visit.   No EKG required for low risk surgery (cataract, skin procedure, breast biopsy, etc).    Revised Cardiac Risk Index (RCRI)  The patient has the following serious cardiovascular risks for perioperative complications:   - No serious cardiac risks = 0 points     RCRI Interpretation: 0 points: Class I (very low risk - 0.4% complication rate)         Signed Electronically by: Hugo Duque MD  Copy of this evaluation report is provided to requesting physician.

## 2024-07-18 ENCOUNTER — TELEPHONE (OUTPATIENT)
Dept: FAMILY MEDICINE | Facility: CLINIC | Age: 79
End: 2024-07-18
Payer: COMMERCIAL

## 2024-07-18 NOTE — TELEPHONE ENCOUNTER
Reason for Call:  Appointment Request    Patient requesting this type of appt:  Annual Wellness Visit     Requested provider: Hugo Duque    Reason patient unable to be scheduled:  Has other PCP       Comments: Spoke with patient to get him scheduled for Annual Wellness Visit. Patient saw Dr. Duque in April for Pre OP appointment. Dr. Mena was listed as preferred provider for Annual Wellness Visit. Patient did not know who Dr. Mena was - asked him if he would want to see Dr. Duque for his AWV - declined. Patient would like to stick with his Primary Care Provider: Dr. Thomas from [facility].         Call taken on 7/18/2024 at 3:15 PM by Kj Barber

## 2024-10-17 ENCOUNTER — PATIENT OUTREACH (OUTPATIENT)
Dept: CARE COORDINATION | Facility: CLINIC | Age: 79
End: 2024-10-17
Payer: COMMERCIAL

## 2024-10-31 ENCOUNTER — PATIENT OUTREACH (OUTPATIENT)
Dept: CARE COORDINATION | Facility: CLINIC | Age: 79
End: 2024-10-31
Payer: COMMERCIAL

## 2024-11-18 ENCOUNTER — LAB (OUTPATIENT)
Dept: LAB | Facility: CLINIC | Age: 79
End: 2024-11-18
Payer: COMMERCIAL

## 2024-11-18 DIAGNOSIS — Z00.00 MEDICARE ANNUAL WELLNESS VISIT, SUBSEQUENT: ICD-10-CM

## 2024-11-18 DIAGNOSIS — R73.01 IFG (IMPAIRED FASTING GLUCOSE): ICD-10-CM

## 2024-11-18 DIAGNOSIS — E78.5 HYPERLIPIDEMIA LDL GOAL <130: ICD-10-CM

## 2024-11-18 LAB
ALBUMIN SERPL BCG-MCNC: 4.2 G/DL (ref 3.5–5.2)
ALP SERPL-CCNC: 56 U/L (ref 40–150)
ALT SERPL W P-5'-P-CCNC: 19 U/L (ref 0–70)
ANION GAP SERPL CALCULATED.3IONS-SCNC: 10 MMOL/L (ref 7–15)
APO A-I SERPL-MCNC: 21 MG/DL
AST SERPL W P-5'-P-CCNC: 26 U/L (ref 0–45)
BASOPHILS # BLD AUTO: 0 10E3/UL (ref 0–0.2)
BASOPHILS NFR BLD AUTO: 1 %
BILIRUB SERPL-MCNC: 0.5 MG/DL
BUN SERPL-MCNC: 16.7 MG/DL (ref 8–23)
CALCIUM SERPL-MCNC: 9.7 MG/DL (ref 8.8–10.4)
CHLORIDE SERPL-SCNC: 105 MMOL/L (ref 98–107)
CREAT SERPL-MCNC: 1.26 MG/DL (ref 0.67–1.17)
CRP SERPL HS-MCNC: 1.43 MG/L
EGFRCR SERPLBLD CKD-EPI 2021: 58 ML/MIN/1.73M2
EOSINOPHIL # BLD AUTO: 0.2 10E3/UL (ref 0–0.7)
EOSINOPHIL NFR BLD AUTO: 3 %
ERYTHROCYTE [DISTWIDTH] IN BLOOD BY AUTOMATED COUNT: 12 % (ref 10–15)
EST. AVERAGE GLUCOSE BLD GHB EST-MCNC: 111 MG/DL
GLUCOSE SERPL-MCNC: 108 MG/DL (ref 70–99)
HBA1C MFR BLD: 5.5 % (ref 0–5.6)
HCO3 SERPL-SCNC: 26 MMOL/L (ref 22–29)
HCT VFR BLD AUTO: 43 % (ref 40–53)
HGB BLD-MCNC: 14.9 G/DL (ref 13.3–17.7)
IMM GRANULOCYTES # BLD: 0 10E3/UL
IMM GRANULOCYTES NFR BLD: 0 %
LYMPHOCYTES # BLD AUTO: 1.8 10E3/UL (ref 0.8–5.3)
LYMPHOCYTES NFR BLD AUTO: 27 %
MCH RBC QN AUTO: 31.6 PG (ref 26.5–33)
MCHC RBC AUTO-ENTMCNC: 34.7 G/DL (ref 31.5–36.5)
MCV RBC AUTO: 91 FL (ref 78–100)
MONOCYTES # BLD AUTO: 0.5 10E3/UL (ref 0–1.3)
MONOCYTES NFR BLD AUTO: 7 %
NEUTROPHILS # BLD AUTO: 4.2 10E3/UL (ref 1.6–8.3)
NEUTROPHILS NFR BLD AUTO: 62 %
PLATELET # BLD AUTO: 177 10E3/UL (ref 150–450)
POTASSIUM SERPL-SCNC: 4.3 MMOL/L (ref 3.4–5.3)
PROT SERPL-MCNC: 6.9 G/DL (ref 6.4–8.3)
RBC # BLD AUTO: 4.71 10E6/UL (ref 4.4–5.9)
SODIUM SERPL-SCNC: 141 MMOL/L (ref 135–145)
T3FREE SERPL-MCNC: 3.3 PG/ML (ref 2–4.4)
T4 FREE SERPL-MCNC: 1.03 NG/DL (ref 0.9–1.7)
TSH SERPL DL<=0.005 MIU/L-ACNC: 3.01 UIU/ML (ref 0.3–4.2)
WBC # BLD AUTO: 6.8 10E3/UL (ref 4–11)

## 2024-11-18 PROCEDURE — 84443 ASSAY THYROID STIM HORMONE: CPT

## 2024-11-18 PROCEDURE — 83704 LIPOPROTEIN BLD QUAN PART: CPT | Mod: 90

## 2024-11-18 PROCEDURE — 85025 COMPLETE CBC W/AUTO DIFF WBC: CPT

## 2024-11-18 PROCEDURE — 80053 COMPREHEN METABOLIC PANEL: CPT

## 2024-11-18 PROCEDURE — 83036 HEMOGLOBIN GLYCOSYLATED A1C: CPT

## 2024-11-18 PROCEDURE — 84439 ASSAY OF FREE THYROXINE: CPT

## 2024-11-18 PROCEDURE — 83695 ASSAY OF LIPOPROTEIN(A): CPT

## 2024-11-18 PROCEDURE — 84481 FREE ASSAY (FT-3): CPT

## 2024-11-18 PROCEDURE — 36415 COLL VENOUS BLD VENIPUNCTURE: CPT

## 2024-11-18 PROCEDURE — 86141 C-REACTIVE PROTEIN HS: CPT

## 2024-11-18 PROCEDURE — 99000 SPECIMEN HANDLING OFFICE-LAB: CPT

## 2024-12-02 ENCOUNTER — OFFICE VISIT (OUTPATIENT)
Dept: OTHER | Facility: CLINIC | Age: 79
End: 2024-12-02
Payer: COMMERCIAL

## 2024-12-02 VITALS
BODY MASS INDEX: 31.58 KG/M2 | HEART RATE: 83 BPM | OXYGEN SATURATION: 98 % | WEIGHT: 203.6 LBS | SYSTOLIC BLOOD PRESSURE: 146 MMHG | DIASTOLIC BLOOD PRESSURE: 77 MMHG

## 2024-12-02 DIAGNOSIS — E78.5 HYPERLIPIDEMIA LDL GOAL <130: ICD-10-CM

## 2024-12-02 DIAGNOSIS — R73.01 IFG (IMPAIRED FASTING GLUCOSE): ICD-10-CM

## 2024-12-02 DIAGNOSIS — I10 ESSENTIAL HYPERTENSION: ICD-10-CM

## 2024-12-02 DIAGNOSIS — N40.0 BENIGN NON-NODULAR PROSTATIC HYPERPLASIA WITHOUT LOWER URINARY TRACT SYMPTOMS: ICD-10-CM

## 2024-12-02 DIAGNOSIS — Z00.00 MEDICARE ANNUAL WELLNESS VISIT, SUBSEQUENT: Primary | ICD-10-CM

## 2024-12-02 DIAGNOSIS — N28.9 RENAL INSUFFICIENCY: ICD-10-CM

## 2024-12-02 PROCEDURE — 99214 OFFICE O/P EST MOD 30 MIN: CPT | Mod: 25 | Performed by: INTERNAL MEDICINE

## 2024-12-02 PROCEDURE — G0439 PPPS, SUBSEQ VISIT: HCPCS | Performed by: INTERNAL MEDICINE

## 2024-12-02 PROCEDURE — G0463 HOSPITAL OUTPT CLINIC VISIT: HCPCS | Performed by: INTERNAL MEDICINE

## 2024-12-02 RX ORDER — SIMVASTATIN 20 MG
20 TABLET ORAL AT BEDTIME
Qty: 90 TABLET | Refills: 3 | Status: SHIPPED | OUTPATIENT
Start: 2024-12-02

## 2024-12-02 RX ORDER — LOSARTAN POTASSIUM 50 MG/1
50 TABLET ORAL DAILY
Qty: 90 TABLET | Refills: 3 | Status: SHIPPED | OUTPATIENT
Start: 2024-12-02

## 2024-12-02 RX ORDER — TERAZOSIN 10 MG/1
CAPSULE ORAL
Qty: 90 CAPSULE | Refills: 3 | Status: SHIPPED | OUTPATIENT
Start: 2024-12-02

## 2024-12-02 NOTE — PROGRESS NOTES
[facility]        Patient is here for a follow up.    Pt is currently taking Statin.    BP (!) 146/77 (BP Location: Right arm, Patient Position: Sitting, Cuff Size: Adult Regular)   Pulse 83   Wt 203 lb 9.6 oz (92.4 kg)   SpO2 98%   BMI 31.58 kg/m      The provider has been notified that the patient has no concerns.     Questions patient would like addressed today are: N/A.    Refills are needed: Yes:  pend    Has homecare services and agency name:  Judy Parson MA

## 2024-12-02 NOTE — PROGRESS NOTES
VASCULAR MEDICINE FOLLOW UP VISIT      A/P:        (Z00.00) Medicare annual wellness visit, subsequent  (primary encounter diagnosis)  Comment: doing well  Plan: CBC with platelets differential, Comprehensive         metabolic panel, TSH, T4 free, T3 Free, Lipid         Profile in 05/2025, RTC two weeks later, order at next visit.        RTC one year for annual physical     (N28.9) Renal insufficiency, resolved  Comment: monitor  Plan: Comprehensive metabolic panel             (R73.01) IFG (impaired fasting glucose)  Comment: RTC six months for labs, see me one week later  Plan: Lipid Profile, Hemoglobin A1c             (E78.5) Hyperlipidemia LDL goal <130  Comment: at goal, RTC six months for labs, see me one week later  Plan: simvastatin (ZOCOR) 20 MG tablet, TSH, T4 free,        T3 Free, Lipid Profile             (Z12.5) Screening for prostate cancer  Comment: normal for age  Plan: Prostate Specific Antigen Screen             (N40.0) Benign non-nodular prostatic hyperplasia without lower urinary tract symptoms, S/P TURP  Comment: needs the below  Plan: terazosin (HYTRIN) 10 MG capsule      (I10) Essential hypertension  Comment: Start the below, RTC one month for BMP, see me several days later for a BP check.   Plan: losartan (COZAAR) 50 MG tablet, OFFICE/OUTPT         VISIT,JEAN MARIE PIERCE III                        26 minutes total not spent on Medicare annual physical spent providing medical care on evaluation and management of preexisting items #2 onward as delineated below.         HPI:     Candelario Ruth is a 79 year old male with htn, HLD, IFG presenting for lab f/u as well as to address meds.      Review Of Systems  Skin: negative  Eyes: negative  Ears/Nose/Throat: negative  Respiratory: No shortness of breath, dyspnea on exertion, cough, or hemoptysis  Cardiovascular: negative  Gastrointestinal: negative  Genitourinary: negative  Musculoskeletal: negative  Neurologic: negative  Psychiatric:  negative  Hematologic/Lymphatic/Immunologic: negative  Endocrine: negative          Current providers caring for this patient include:  Patient Care Team:  Trenton Thomas MD as PCP - General  Trenton Thomas MD as Assigned Heart and Vascular Provider           PAST MEDICAL HISTORY:                  Past Medical History:   Diagnosis Date    BPH     Sleep apnea        PAST SURGICAL HISTORY:                  Past Surgical History:   Procedure Laterality Date    CYSTOSCOPY, TRANSURETHRAL RESECTION (TUR) PROSTATE, COMBINED N/A 2016    Procedure: COMBINED CYSTOSCOPY, TRANSURETHRAL RESECTION (TUR) PROSTATE;  Surgeon: Pepe Srinivasan MD;  Location:  OR    [facility] NONSPECIFIC PROCEDURE      lumbar surgery       CURRENT MEDICATIONS:                  Current Outpatient Medications   Medication Sig Dispense Refill    simvastatin (ZOCOR) 20 MG tablet Take 1 tablet (20 mg) by mouth at bedtime 90 tablet 3    terazosin (HYTRIN) 10 MG capsule TAKE 1 CAPSULE BY MOUTH AT BEDTIME 90 capsule 3       ALLERGIES:                  Allergies   Allergen Reactions    Amoxicillin      Turns him purple       SOCIAL HISTORY:                  Social History     Socioeconomic History    Marital status:      Spouse name: Andra    Number of children: 3    Years of education: 14    Highest education level: Not on file   Occupational History    Occupation: Rewardable     Employer: [employer]   Tobacco Use    Smoking status: Former     Current packs/day: 0.00     Types: Cigarettes     Quit date: 3/6/1973     Years since quittin.7    Smokeless tobacco: Never   Substance and Sexual Activity    Alcohol use: Not Currently     Comment: occasional    Drug use: No    Sexual activity: Not Currently     Partners: Female     Comment: wife   Other Topics Concern    Parent/sibling w/ CABG, MI or angioplasty before 65F 55M? No   Social History Narrative    Not on file     Social Drivers of Health      Financial Resource Strain: Not on file   Food Insecurity: Not on file   Transportation Needs: Not on file   Physical Activity: Not on file   Stress: Not on file   Social Connections: Not on file   Interpersonal Safety: Low Risk  (4/15/2024)    Interpersonal Safety     Do you feel physically and emotionally safe where you currently live?: Yes     Within the past 12 months, have you been hit, slapped, kicked or otherwise physically hurt by someone?: No     Within the past 12 months, have you been humiliated or emotionally abused in other ways by your partner or ex-partner?: No   Housing Stability: Not on file       FAMILY HISTORY:                   Family History   Problem Relation Age of Onset    Heart Disease Mother         alive age (19[birth year])    Diabetes Father          age 84    Cerebrovascular Disease Father     Cerebrovascular Disease Brother         alive age 57    Family History Negative Sister         alive age 60    Family History Negative Sister         alive age 55    Family History Negative Sister         alive age 52          Diet: regular, low salt/low fat  Physical Activity: active without specific exercise program  Depression Screen:    Over the past 2 weeks, patient has felt down, depressed, or hopeless:  No    Over the past 2 weeks, patient has felt little interest or pleasure in doing things: No     Functional ability/Safety screen:  Up and go test (able to get up and walk longer than 30 seconds): Passed  Patient needs assistance with: nothing  Patient's home has the following possible safety concerns: none identified  Patient has concerns about his hearing:  No  Cognitive Screen  Patient repeats three objects (ball, flag, tree)      Clock drawing test:   NORMAL  Recalls three objects after 3 minutes (ball,flag,tree):                                                                                               recalls 3 objects (3 points)     Physical Exam:             BP (!) 146/77 (BP  Location: Right arm, Patient Position: Sitting, Cuff Size: Adult Regular)   Pulse 83   Wt 203 lb 9.6 oz (92.4 kg)   SpO2 98%   BMI 31.58 kg/m             Physical exam Reveals:    O/P: WNL  HEENT: WNL  NECK: No JVD, thyromegaly, or lymphadenopathy  HEART: RRR, no murmurs, gallops, or rubs  LUNGS: CTA bilaterally without rales, wheezes, or rhonchi  GI: NABS, nondistended, nontender, soft  EXT:without cyanosis, clubbing, or edema  NEURO: nonfocal  : no flank tenderness     End of Life Planning:   Patient currently has an advanced directive: Yes.  Practitioner is supportive of decision.     Education/Counseling:   Based on review of the above information, the following items were addressed:      Elevated blood pressure - follow-up plans made     Appropriate preventive services were discussed with this patient, including applicable screening as appropriate for cardiovascular disease, diabetes, osteopenia/osteoporosis, and glaucoma.  As appropriate for age/gender, discussed screening for colorectal cancer, prostate cancer, breast cancer, and cervical cancer.   Checklist reviewing preventive services available has been given to the patient.

## 2024-12-04 ENCOUNTER — TELEPHONE (OUTPATIENT)
Dept: OTHER | Facility: CLINIC | Age: 79
End: 2024-12-04
Payer: COMMERCIAL

## 2024-12-04 DIAGNOSIS — N28.9 RENAL INSUFFICIENCY: ICD-10-CM

## 2024-12-04 DIAGNOSIS — E78.5 HYPERLIPIDEMIA LDL GOAL <130: Primary | ICD-10-CM

## 2024-12-04 DIAGNOSIS — I10 ESSENTIAL HYPERTENSION: ICD-10-CM

## 2024-12-04 NOTE — TELEPHONE ENCOUNTER
Routing to scheduling to coordinate the following:    Fasting labs  In person follow up 2-3 days after lab  Please schedule this in 1 month     Appt note: Follow up to 12/2/24    Maria G MOISE, CANDIDA    [place]  Office: [phone]  Fax: [phone]

## 2024-12-06 NOTE — TELEPHONE ENCOUNTER
LM for patient to call us back to schedule:    Fasting labs  In person follow up 2-3 days after lab  Please schedule this in 1 month     Appt note: Follow up to 12/2/24

## 2024-12-10 NOTE — TELEPHONE ENCOUNTER
Patient is scheduled for his non fasting lab (confirmed by RN should not be fasting) on 01/03/25 and follow up with Dr Thomas on 01/08/25.

## 2025-01-08 ENCOUNTER — OFFICE VISIT (OUTPATIENT)
Dept: OTHER | Facility: CLINIC | Age: 80
End: 2025-01-08
Attending: INTERNAL MEDICINE
Payer: COMMERCIAL

## 2025-01-08 VITALS
HEART RATE: 58 BPM | WEIGHT: 204.6 LBS | SYSTOLIC BLOOD PRESSURE: 123 MMHG | DIASTOLIC BLOOD PRESSURE: 75 MMHG | BODY MASS INDEX: 31.74 KG/M2 | OXYGEN SATURATION: 96 %

## 2025-01-08 DIAGNOSIS — I10 ESSENTIAL HYPERTENSION: ICD-10-CM

## 2025-01-08 DIAGNOSIS — E78.5 HYPERLIPIDEMIA LDL GOAL <130: ICD-10-CM

## 2025-01-08 DIAGNOSIS — R73.01 IFG (IMPAIRED FASTING GLUCOSE): Primary | ICD-10-CM

## 2025-01-08 DIAGNOSIS — N28.9 RENAL INSUFFICIENCY: ICD-10-CM

## 2025-01-08 PROCEDURE — G0463 HOSPITAL OUTPT CLINIC VISIT: HCPCS | Performed by: INTERNAL MEDICINE

## 2025-01-08 PROCEDURE — 99214 OFFICE O/P EST MOD 30 MIN: CPT | Performed by: INTERNAL MEDICINE

## 2025-01-08 PROCEDURE — G2211 COMPLEX E/M VISIT ADD ON: HCPCS | Performed by: INTERNAL MEDICINE

## 2025-01-08 NOTE — PROGRESS NOTES
[facility]        Patient is here for a  follow up.    Pt is currently taking Statin.    /75 (BP Location: Right arm, Patient Position: Chair, Cuff Size: Adult Regular)   Pulse 58   Wt 204 lb 9.6 oz (92.8 kg)   SpO2 96%   BMI 31.74 kg/m      The provider has been notified that the patient has no concerns.     Questions patient would like addressed today are: N/A.    Refills are needed: N/A    Has homecare services and agency name:  Judy Hartmann MA

## 2025-05-21 ENCOUNTER — LAB (OUTPATIENT)
Dept: LAB | Facility: CLINIC | Age: 80
End: 2025-05-21
Payer: COMMERCIAL

## 2025-05-21 DIAGNOSIS — R73.01 IFG (IMPAIRED FASTING GLUCOSE): ICD-10-CM

## 2025-05-21 DIAGNOSIS — E78.5 HYPERLIPIDEMIA LDL GOAL <130: ICD-10-CM

## 2025-05-21 LAB
ALBUMIN SERPL BCG-MCNC: 4.2 G/DL (ref 3.5–5.2)
ALP SERPL-CCNC: 54 U/L (ref 40–150)
ALT SERPL W P-5'-P-CCNC: 20 U/L (ref 0–70)
ANION GAP SERPL CALCULATED.3IONS-SCNC: 8 MMOL/L (ref 7–15)
AST SERPL W P-5'-P-CCNC: 23 U/L (ref 0–45)
BILIRUB SERPL-MCNC: 0.5 MG/DL
BUN SERPL-MCNC: 15.2 MG/DL (ref 8–23)
CALCIUM SERPL-MCNC: 9.4 MG/DL (ref 8.8–10.4)
CHLORIDE SERPL-SCNC: 104 MMOL/L (ref 98–107)
CREAT SERPL-MCNC: 1.24 MG/DL (ref 0.67–1.17)
CRP SERPL HS-MCNC: 0.53 MG/L
EGFRCR SERPLBLD CKD-EPI 2021: 59 ML/MIN/1.73M2
EST. AVERAGE GLUCOSE BLD GHB EST-MCNC: 114 MG/DL
GLUCOSE SERPL-MCNC: 107 MG/DL (ref 70–99)
HBA1C MFR BLD: 5.6 % (ref 0–5.6)
HCO3 SERPL-SCNC: 27 MMOL/L (ref 22–29)
POTASSIUM SERPL-SCNC: 4.5 MMOL/L (ref 3.4–5.3)
PROT SERPL-MCNC: 6.6 G/DL (ref 6.4–8.3)
SODIUM SERPL-SCNC: 139 MMOL/L (ref 135–145)
T3FREE SERPL-MCNC: 3.2 PG/ML (ref 2–4.4)
T4 FREE SERPL-MCNC: 0.99 NG/DL (ref 0.9–1.7)
TSH SERPL DL<=0.005 MIU/L-ACNC: 3.84 UIU/ML (ref 0.3–4.2)

## 2025-05-21 PROCEDURE — 36415 COLL VENOUS BLD VENIPUNCTURE: CPT

## 2025-05-21 PROCEDURE — 84481 FREE ASSAY (FT-3): CPT

## 2025-05-21 PROCEDURE — 83704 LIPOPROTEIN BLD QUAN PART: CPT | Mod: 90

## 2025-05-21 PROCEDURE — 84439 ASSAY OF FREE THYROXINE: CPT

## 2025-05-21 PROCEDURE — 80061 LIPID PANEL: CPT | Mod: 90

## 2025-05-21 PROCEDURE — 80053 COMPREHEN METABOLIC PANEL: CPT

## 2025-05-21 PROCEDURE — 83036 HEMOGLOBIN GLYCOSYLATED A1C: CPT

## 2025-05-21 PROCEDURE — 84443 ASSAY THYROID STIM HORMONE: CPT

## 2025-05-21 PROCEDURE — 99000 SPECIMEN HANDLING OFFICE-LAB: CPT

## 2025-05-21 PROCEDURE — 86141 C-REACTIVE PROTEIN HS: CPT

## 2025-05-21 PROCEDURE — 83695 ASSAY OF LIPOPROTEIN(A): CPT

## 2025-05-22 LAB — APO A-I SERPL-MCNC: 15 MG/DL

## 2025-05-25 LAB
CHOLEST SERPL-MCNC: 114 MG/DL
HDL SERPL QN: 8.6 NM
HDL SERPL-SCNC: 32 UMOL/L
HDLC SERPL-MCNC: 38 MG/DL
HLD.LARGE SERPL-SCNC: 2.9 UMOL/L
LDL SERPL QN: 20.4 NM
LDL SERPL-SCNC: 1066 NMOL/L
LDL SMALL SERPL-SCNC: 618 NMOL/L
LDLC SERPL CALC-MCNC: 59 MG/DL
PATHOLOGY STUDY: ABNORMAL
TRIGL SERPL-MCNC: 87 MG/DL
VLDL LARGE SERPL-SCNC: 3 NMOL/L
VLDL SERPL QN: 50.5 NM

## 2025-05-27 ENCOUNTER — RESULTS FOLLOW-UP (OUTPATIENT)
Dept: OTHER | Facility: CLINIC | Age: 80
End: 2025-05-27

## 2025-06-04 ENCOUNTER — OFFICE VISIT (OUTPATIENT)
Dept: OTHER | Facility: CLINIC | Age: 80
End: 2025-06-04
Attending: INTERNAL MEDICINE
Payer: COMMERCIAL

## 2025-06-04 VITALS
OXYGEN SATURATION: 97 % | DIASTOLIC BLOOD PRESSURE: 70 MMHG | SYSTOLIC BLOOD PRESSURE: 121 MMHG | HEART RATE: 64 BPM | WEIGHT: 203.8 LBS | BODY MASS INDEX: 31.61 KG/M2

## 2025-06-04 DIAGNOSIS — E78.5 HYPERLIPIDEMIA LDL GOAL <130: ICD-10-CM

## 2025-06-04 DIAGNOSIS — Z00.00 MEDICARE ANNUAL WELLNESS VISIT, SUBSEQUENT: Primary | ICD-10-CM

## 2025-06-04 DIAGNOSIS — R73.01 IFG (IMPAIRED FASTING GLUCOSE): ICD-10-CM

## 2025-06-04 DIAGNOSIS — N28.9 RENAL INSUFFICIENCY: ICD-10-CM

## 2025-06-04 DIAGNOSIS — I10 ESSENTIAL HYPERTENSION: ICD-10-CM

## 2025-06-04 DIAGNOSIS — N40.0 BENIGN NON-NODULAR PROSTATIC HYPERPLASIA WITHOUT LOWER URINARY TRACT SYMPTOMS: ICD-10-CM

## 2025-06-04 PROCEDURE — G0463 HOSPITAL OUTPT CLINIC VISIT: HCPCS | Performed by: INTERNAL MEDICINE

## 2025-06-04 RX ORDER — LOSARTAN POTASSIUM 50 MG/1
50 TABLET ORAL DAILY
Qty: 90 TABLET | Refills: 3 | Status: SHIPPED | OUTPATIENT
Start: 2025-06-04

## 2025-06-04 RX ORDER — SIMVASTATIN 20 MG
20 TABLET ORAL AT BEDTIME
Qty: 90 TABLET | Refills: 3 | Status: SHIPPED | OUTPATIENT
Start: 2025-06-04

## 2025-06-04 RX ORDER — TERAZOSIN 10 MG/1
CAPSULE ORAL
Qty: 90 CAPSULE | Refills: 3 | Status: SHIPPED | OUTPATIENT
Start: 2025-06-04

## 2025-06-04 NOTE — PROGRESS NOTES
VASCULAR MEDICINE FOLLOW UP VISIT            Assessment & Plan         (Z00.00) Medicare annual wellness visit, subsequent  (primary encounter diagnosis)  Comment: Doing well   Plan: CBC with Platelets & Differential        RTC one year for annual physcial    (E78.5) Hyperlipidemia LDL goal <130  Comment: At goal  Plan: simvastatin (ZOCOR) 20 MG tablet, C-Reactive         Protein, High Sensitivity, LipoFit by NMR,         Lipoprotein (a), T3 Free, T4 free, TSH,         OFFICE/OUTPT VISIT,EST,LEVL III           (N28.9) Renal insufficiency  Comment: Stable  Plan: OFFICE/OUTPT VISIT,EST,LEVL III            (I10) Essential hypertension  Comment: At goal.  Plan: losartan (COZAAR) 50 MG tablet, OFFICE/OUTPT         VISIT,EST,LEVL III            (R73.01) IFG (impaired fasting glucose)  Comment: Avoid CHO  Plan: Comprehensive metabolic panel, Hemoglobin A1c,         OFFICE/OUTPT VISIT,EST,LEVL III           (N40.0) Benign non-nodular prostatic hyperplasia without lower urinary tract symptoms, S/P TURP  Comment: Needs the below.   Plan: terazosin (HYTRIN) 10 MG capsule, OFFICE/OUTPT         VISIT,EST,LEVL III            The longitudinal care of plan for Ramo was addressed during this visit. Due to added complexity of care, we will continue to support Candelario Ruth  and the subsequent management of these conditions and with ongoing continuity of care for these conditions.       Greater than one half the 34 minutes not spent on preventive care during this visit was spent providing aducation and counselling regarding item(s) # 2 onward as delineated above.       Follow Up/Next Steps    No follow-ups on file.  Patient was scheduled for a follow up visit PCP to discuss htn, HLD, annual physical    Counseling and Education  Reviewed preventive health counseling, as reflected in patient instructions        Appropriate preventive services were discussed with this patient, including applicable screening as appropriate for  cardiovascular disease, diabetes, osteopenia/osteoporosis, and glaucoma.  As appropriate for age/gender, discussed screening for colorectal cancer, prostate cancer, breast cancer, and cervical cancer. Checklist reviewing preventive services available has been given to the patient.    Reviewed patients plan of care and provided an AVS. The Basic Care Plan (routine screening as documented in Health Maintenance) for Candelario meets the Care Plan requirement. This Care Plan has been established and reviewed with the Patient.    Visit Provider: Trenton Thomas MD  Supervising Provider: Trenton Thomas MD  [facility] BELEM Palomino is a 80 year old who is being seen for an Annual Wellness Visit  accompanied by his spouse    HPI      Candelario Ruth is a 80 year old male with htn, HLD presenting for annual physical as well as med and lab review.       Do you feel safe in your environment? Yes    Have you ever done Advance Care Planning? (For example, a Health Directive, POLST, or a discussion with a medical provider or your loved ones about your wishes): Yes, advance care planning is on file.    Fall risk  Fallen 2 or more times in the past year?: No  Cognitive Screening   1) Repeat 3 items (Leader, Season, Table)    2) Clock draw: NORMAL  3) 3 item recall: Recalls 3 objects  Results: 3 items recalled: COGNITIVE IMPAIRMENT LESS LIKELY    Mini-CogTM Copyright YASH Burden. Licensed by the author for use in [facility]; reprinted with permission (andre@.[facility]). All rights reserved.      Do you have sleep apnea, excessive snoring or daytime drowsiness? : no    Reviewed and updated as needed this visit by clinical staff   Tobacco  Allergies  Meds              Social History     Tobacco Use    Smoking status: Former     Current packs/day: 0.00     Types: Cigarettes     Quit date: 3/6/1973     Years since quittin.2    Smokeless tobacco: Never   Substance  "Use Topics    Alcohol use: Not Currently     Comment: occasional       Current providers sharing in care for this patient include:   Patient Care Team:  Trenton Thomas MD as PCP - General  Ternton Thomas MD as Assigned Heart and Vascular Provider  Hugo Duque MD as Assigned PCP    The following health maintenance items are reviewed in Epic and correct as of today:  Health Maintenance Due   Topic Date Due    COLORECTAL CANCER SCREENING  12/19/2021    LIPID  10/24/2023    COVID-19 VACCINE (8 - 2024-25 season) 09/01/2024    DTAP/TDAP/TD VACCINE (2 - Td or Tdap) 11/26/2024               Objective    /70 (BP Location: Left arm, Patient Position: Chair, Cuff Size: Adult Regular)   Pulse 64   Wt 203 lb 12.8 oz (92.4 kg)   SpO2 97%   BMI 31.61 kg/m   Estimated body mass index is 31.61 kg/m  as calculated from the following:    Height as of 4/15/24: 5' 7.32\" (1.71 m).    Weight as of this encounter: 203 lb 12.8 oz (92.4 kg).  Physical Exam  Patient appears comfortable and in no acute distress.            Physical exam Reveals:    O/P: WNL  HEENT: WNL  NECK: No JVD, thyromegaly, or lymphadenopathy  HEART: RRR, no murmurs, gallops, or rubs  LUNGS: CTA bilaterally without rales, wheezes, or rhonchi  GI: NABS, nondistended, nontender, soft  EXT:without cyanosis, clubbing, or edema  NEURO: nonfocal  : no flank tenderness           All relevant labs and imaging reviewed by myself on today's date.    Identified Health Risks: none    "

## 2025-06-04 NOTE — PROGRESS NOTES
[facility]        Patient is here for a  follow up.    Pt is currently taking Statin.    /70 (BP Location: Left arm, Patient Position: Chair, Cuff Size: Adult Regular)   Pulse 64   Wt 203 lb 12.8 oz (92.4 kg)   SpO2 97%   BMI 31.61 kg/m      The provider has been notified that the patient has no concerns.     Questions patient would like addressed today are: N/A.    Refills are needed: N/A    Has homecare services and agency name:  Judy Hartmann MA